# Patient Record
Sex: FEMALE | Race: WHITE | NOT HISPANIC OR LATINO | Employment: FULL TIME | URBAN - METROPOLITAN AREA
[De-identification: names, ages, dates, MRNs, and addresses within clinical notes are randomized per-mention and may not be internally consistent; named-entity substitution may affect disease eponyms.]

---

## 2019-09-16 ENCOUNTER — APPOINTMENT (OUTPATIENT)
Dept: LAB | Facility: HOSPITAL | Age: 64
End: 2019-09-16

## 2019-09-16 ENCOUNTER — TRANSCRIBE ORDERS (OUTPATIENT)
Dept: OCCUPATIONAL MEDICINE | Facility: CLINIC | Age: 64
End: 2019-09-16

## 2019-09-16 ENCOUNTER — APPOINTMENT (OUTPATIENT)
Dept: OCCUPATIONAL MEDICINE | Facility: CLINIC | Age: 64
End: 2019-09-16

## 2019-09-16 DIAGNOSIS — Z00.8 SPECIAL EXAM: Primary | ICD-10-CM

## 2019-09-16 DIAGNOSIS — Z00.8 SPECIAL EXAM: ICD-10-CM

## 2019-09-16 LAB
HBV SURFACE AB SER-ACNC: <3.1 MIU/ML
RUBV IGG SERPL IA-ACNC: >175 IU/ML

## 2019-09-16 PROCEDURE — 86765 RUBEOLA ANTIBODY: CPT

## 2019-09-16 PROCEDURE — 86480 TB TEST CELL IMMUN MEASURE: CPT

## 2019-09-16 PROCEDURE — 36415 COLL VENOUS BLD VENIPUNCTURE: CPT

## 2019-09-16 PROCEDURE — 86762 RUBELLA ANTIBODY: CPT

## 2019-09-16 PROCEDURE — 86787 VARICELLA-ZOSTER ANTIBODY: CPT

## 2019-09-16 PROCEDURE — 86706 HEP B SURFACE ANTIBODY: CPT

## 2019-09-16 PROCEDURE — 86735 MUMPS ANTIBODY: CPT

## 2019-09-17 LAB
MEV IGG SER QL: NORMAL
MUV IGG SER QL: NORMAL
VZV IGG SER IA-ACNC: NORMAL

## 2019-09-18 LAB
GAMMA INTERFERON BACKGROUND BLD IA-ACNC: 0.03 IU/ML
M TB IFN-G BLD-IMP: NEGATIVE
M TB IFN-G CD4+ BCKGRND COR BLD-ACNC: 0 IU/ML
M TB IFN-G CD4+ BCKGRND COR BLD-ACNC: 0.01 IU/ML
MEV IGM SER-ACNC: <0.91 ISR (ref 0–0.9)
MITOGEN IGNF BCKGRD COR BLD-ACNC: >10 IU/ML

## 2019-11-11 ENCOUNTER — TRANSCRIBE ORDERS (OUTPATIENT)
Dept: ADMINISTRATIVE | Facility: HOSPITAL | Age: 64
End: 2019-11-11

## 2019-11-11 ENCOUNTER — APPOINTMENT (OUTPATIENT)
Dept: LAB | Facility: HOSPITAL | Age: 64
End: 2019-11-11

## 2019-11-11 DIAGNOSIS — Z01.84 IMMUNITY STATUS TESTING: Primary | ICD-10-CM

## 2019-11-11 DIAGNOSIS — Z01.84 IMMUNITY STATUS TESTING: ICD-10-CM

## 2019-11-11 LAB — HBV SURFACE AB SER-ACNC: >1000 MIU/ML

## 2019-11-11 PROCEDURE — 86706 HEP B SURFACE ANTIBODY: CPT

## 2019-11-11 PROCEDURE — 36415 COLL VENOUS BLD VENIPUNCTURE: CPT

## 2019-11-25 ENCOUNTER — TELEPHONE (OUTPATIENT)
Dept: PSYCHIATRY | Facility: CLINIC | Age: 64
End: 2019-11-25

## 2020-03-12 ENCOUNTER — APPOINTMENT (OUTPATIENT)
Dept: URGENT CARE | Facility: CLINIC | Age: 65
End: 2020-03-12
Payer: OTHER MISCELLANEOUS

## 2020-03-12 ENCOUNTER — APPOINTMENT (OUTPATIENT)
Dept: RADIOLOGY | Facility: CLINIC | Age: 65
End: 2020-03-12
Payer: OTHER MISCELLANEOUS

## 2020-03-12 DIAGNOSIS — M54.2 CERVICAL SPINE PAIN: Primary | ICD-10-CM

## 2020-03-12 DIAGNOSIS — M54.2 CERVICAL SPINE PAIN: ICD-10-CM

## 2020-03-12 PROCEDURE — G0382 LEV 3 HOSP TYPE B ED VISIT: HCPCS

## 2020-03-12 PROCEDURE — 72040 X-RAY EXAM NECK SPINE 2-3 VW: CPT

## 2020-03-12 PROCEDURE — 99283 EMERGENCY DEPT VISIT LOW MDM: CPT

## 2020-03-19 ENCOUNTER — APPOINTMENT (OUTPATIENT)
Dept: URGENT CARE | Facility: CLINIC | Age: 65
End: 2020-03-19
Payer: OTHER MISCELLANEOUS

## 2020-03-19 PROCEDURE — 99213 OFFICE O/P EST LOW 20 MIN: CPT

## 2020-04-02 ENCOUNTER — APPOINTMENT (OUTPATIENT)
Dept: URGENT CARE | Facility: CLINIC | Age: 65
End: 2020-04-02
Payer: OTHER MISCELLANEOUS

## 2020-04-02 PROCEDURE — 99213 OFFICE O/P EST LOW 20 MIN: CPT

## 2020-04-17 ENCOUNTER — APPOINTMENT (OUTPATIENT)
Dept: URGENT CARE | Facility: CLINIC | Age: 65
End: 2020-04-17
Payer: OTHER MISCELLANEOUS

## 2020-04-17 PROCEDURE — 99213 OFFICE O/P EST LOW 20 MIN: CPT

## 2020-12-23 ENCOUNTER — IMMUNIZATIONS (OUTPATIENT)
Dept: FAMILY MEDICINE CLINIC | Facility: HOSPITAL | Age: 65
End: 2020-12-23

## 2020-12-23 DIAGNOSIS — Z23 ENCOUNTER FOR IMMUNIZATION: ICD-10-CM

## 2020-12-23 PROCEDURE — 0011A SARS-COV-2 / COVID-19 MRNA VACCINE (MODERNA) 100 MCG: CPT

## 2020-12-23 PROCEDURE — 91301 SARS-COV-2 / COVID-19 MRNA VACCINE (MODERNA) 100 MCG: CPT

## 2021-01-20 ENCOUNTER — IMMUNIZATIONS (OUTPATIENT)
Dept: FAMILY MEDICINE CLINIC | Facility: HOSPITAL | Age: 66
End: 2021-01-20

## 2021-01-20 DIAGNOSIS — Z23 ENCOUNTER FOR IMMUNIZATION: Primary | ICD-10-CM

## 2021-01-20 PROCEDURE — 0012A SARS-COV-2 / COVID-19 MRNA VACCINE (MODERNA) 100 MCG: CPT

## 2021-01-20 PROCEDURE — 91301 SARS-COV-2 / COVID-19 MRNA VACCINE (MODERNA) 100 MCG: CPT

## 2021-06-03 ENCOUNTER — OFFICE VISIT (OUTPATIENT)
Dept: FAMILY MEDICINE CLINIC | Facility: CLINIC | Age: 66
End: 2021-06-03
Payer: COMMERCIAL

## 2021-06-03 VITALS
HEIGHT: 64 IN | SYSTOLIC BLOOD PRESSURE: 122 MMHG | OXYGEN SATURATION: 98 % | BODY MASS INDEX: 26.46 KG/M2 | HEART RATE: 76 BPM | TEMPERATURE: 97.2 F | WEIGHT: 155 LBS | DIASTOLIC BLOOD PRESSURE: 78 MMHG

## 2021-06-03 DIAGNOSIS — Z12.11 SCREENING FOR COLON CANCER: ICD-10-CM

## 2021-06-03 DIAGNOSIS — R63.5 WEIGHT GAIN: ICD-10-CM

## 2021-06-03 DIAGNOSIS — Z12.31 ENCOUNTER FOR SCREENING MAMMOGRAM FOR MALIGNANT NEOPLASM OF BREAST: ICD-10-CM

## 2021-06-03 DIAGNOSIS — Z00.00 ANNUAL PHYSICAL EXAM: Primary | ICD-10-CM

## 2021-06-03 DIAGNOSIS — H61.23 IMPACTED CERUMEN, BILATERAL: ICD-10-CM

## 2021-06-03 PROCEDURE — 69209 REMOVE IMPACTED EAR WAX UNI: CPT | Performed by: FAMILY MEDICINE

## 2021-06-03 PROCEDURE — 99386 PREV VISIT NEW AGE 40-64: CPT | Performed by: FAMILY MEDICINE

## 2021-06-03 NOTE — PROGRESS NOTES
[address] [place] [address]    NAME: Austen Nassar  AGE: 63 y o  SEX: female  : 1955     DATE: 6/3/2021 Ear cerumen removal    Date/Time: 6/3/2021 2:57 PM  Performed by: Kim Mercado MD  Authorized by: Kim Mercado MD   Universal Protocol:  Consent: Verbal consent obtained  Consent given by: patient  Patient identity confirmed: verbally with patient      Patient location:  Clinic  Procedure details:     Local anesthetic:  None    Location:  L ear and R ear    Procedure type: irrigation only      Approach:  External  Post-procedure details:     Complication:  None    Hearing quality:  Diminished    Patient tolerance of procedure: Tolerated well, no immediate complications  Comments:      Unsuccessful, needs to go home and apply debrox drops over the next few days, will try irrigation at home in addition, will f/u if no wax comes out, wax last cleaned out over 55 years ago when  She was a child           Assessment and Plan:     Problem List Items Addressed This Visit     None      Visit Diagnoses     Annual physical exam    -  Primary    Relevant Orders    CBC and differential    Comprehensive metabolic panel    Lipid panel    Screening for colon cancer        Relevant Orders    Cologuard    Encounter for screening mammogram for malignant neoplasm of breast        Relevant Orders    Mammo screening bilateral w 3d & cad    Weight gain        Relevant Orders    TSH, 3rd generation          Immunizations and preventive care screenings were discussed with patient today  Appropriate education was printed on patient's after visit summary  Counseling:  Dental Health: discussed importance of regular tooth brushing, flossing, and dental visits  · Exercise: the importance of regular exercise/physical activity was discussed  Recommend exercise 3-5 times per week for at least 30 minutes  BMI Counseling: Body mass index is 26 61 kg/m²   The BMI is above normal  Nutrition recommendations include decreasing portion sizes, encouraging healthy choices of fruits and vegetables, consuming healthier snacks, limiting drinks that contain sugar, moderation in carbohydrate intake and increasing intake of lean protein  Exercise recommendations include moderate physical activity 150 minutes/week, exercising 3-5 times per week and strength training exercises  No pharmacotherapy was ordered  Patient referred to PCP due to patient being overweight  Return in about 1 year (around 6/3/2022)  Chief Complaint:     Chief Complaint   Patient presents with    Establish Care      History of Present Illness:     Adult Annual Physical   Patient here for a comprehensive physical exam  The patient reports no problems  Diet and Physical Activity  · Diet/Nutrition: well balanced diet, low carb diet, consuming 3-5 servings of fruits/vegetables daily and adequate fiber intake  · Exercise: walking, moderate cardiovascular exercise, strength training exercises, 3-4 times a week on average and 30-60 minutes on average  Depression Screening  PHQ-9 Depression Screening    PHQ-9:   Frequency of the following problems over the past two weeks:      Little interest or pleasure in doing things: 0 - not at all  Feeling down, depressed, or hopeless: 0 - not at all  PHQ-2 Score: 0       General Health  · Sleep: sleeps well and gets 7-8 hours of sleep on average  · Hearing: normal - bilateral   · Vision: goes for regular eye exams, most recent eye exam <1 year ago and wears contacts  · Dental: regular dental visits and brushes teeth twice daily  /GYN Health  · Patient is: postmenopausal  · Last menstrual period: 2016  · Contraceptive method: post jamie  Review of Systems:     Review of Systems   Constitutional: Negative for fatigue and fever  HENT: Negative for congestion  Eyes: Negative for visual disturbance     Respiratory: Negative for chest tightness and shortness of breath  Cardiovascular: Negative for chest pain and palpitations  Gastrointestinal: Negative for abdominal pain, constipation and diarrhea  Genitourinary: Negative for difficulty urinating  Musculoskeletal: Positive for arthralgias (s/p shoulder surgeries)  Neurological: Negative for headaches  Hematological: Does not bruise/bleed easily        Past Medical History:     Past Medical History:   Diagnosis Date    Allergic Not sure    Hives      Past Surgical History:     Past Surgical History:   Procedure Laterality Date    APPENDECTOMY  May 1986     SECTION  1985, 06/15/1987, 1990   [facility] SHOULDER SURGERY  ,       Social History:        Social History     Socioeconomic History    Marital status: Single     Spouse name: None    Number of children: None    Years of education: None    Highest education level: None   Occupational History    None   Social Needs    Financial resource strain: None    Food insecurity     Worry: None     Inability: None    Transportation needs     Medical: None     Non-medical: None   Tobacco Use    Smoking status: Never Smoker    Smokeless tobacco: Never Used   Substance and Sexual Activity    Alcohol use: Not Currently     Alcohol/week: 2 0 standard drinks     Types: 2 Glasses of wine per week    Drug use: Never    Sexual activity: Yes     Partners: Male     Birth control/protection: None     Comment: Monogamy   Lifestyle    Physical activity     Days per week: None     Minutes per session: None    Stress: None   Relationships    Social connections     Talks on phone: None     Gets together: None     Attends Voodoo service: None     Active member of club or organization: None     Attends meetings of clubs or organizations: None     Relationship status: None    Intimate partner violence     Fear of current or ex partner: None     Emotionally abused: None     Physically abused: None     Forced sexual activity: None Other Topics Concern    None   Social History Narrative    None      Family History:     Family History   Problem Relation Age of Onset    Cancer Sister         Leukemia    Leukemia Sister     Heart attack Mother 80    Abdominal aortic aneurysm Father     Arthritis Sister       Current Medications:     No current outpatient medications on file  No current facility-administered medications for this visit  Allergies: Allergies   Allergen Reactions    Penicillins Hives and Itching      Physical Exam:     /78 (BP Location: Right arm, Patient Position: Sitting, Cuff Size: Standard)   Pulse 76   Temp (!) 97 2 °F (36 2 °C)   Ht 5' 4" (1 626 m)   Wt 70 3 kg (155 lb)   SpO2 98%   BMI 26 61 kg/m²     Physical Exam  Vitals signs reviewed  Constitutional:       Appearance: Normal appearance  She is well-developed  HENT:      Head: Normocephalic and atraumatic  Right Ear: Tympanic membrane, ear canal and external ear normal  There is impacted cerumen  Left Ear: Tympanic membrane, ear canal and external ear normal  There is impacted cerumen  Eyes:      Conjunctiva/sclera: Conjunctivae normal       Pupils: Pupils are equal, round, and reactive to light  Neck:      Musculoskeletal: Normal range of motion and neck supple  Cardiovascular:      Rate and Rhythm: Normal rate and regular rhythm  Heart sounds: Normal heart sounds  Pulmonary:      Effort: Pulmonary effort is normal       Breath sounds: Normal breath sounds  Abdominal:      General: Bowel sounds are normal       Palpations: Abdomen is soft  Musculoskeletal: Normal range of motion  Lymphadenopathy:      Cervical: No cervical adenopathy  Skin:     General: Skin is warm  Capillary Refill: Capillary refill takes less than 2 seconds  Neurological:      Mental Status: She is alert and oriented to person, place, and time     Psychiatric:         Mood and Affect: Mood normal          Behavior: Behavior normal          Thought Content:  Thought content normal          Judgment: Judgment normal           Katherine Litten, MD [facility]

## 2021-06-03 NOTE — PATIENT INSTRUCTIONS

## 2021-06-15 ENCOUNTER — APPOINTMENT (OUTPATIENT)
Dept: LAB | Facility: CLINIC | Age: 66
End: 2021-06-15
Payer: COMMERCIAL

## 2021-06-15 DIAGNOSIS — Z00.00 ANNUAL PHYSICAL EXAM: ICD-10-CM

## 2021-06-15 DIAGNOSIS — R63.5 WEIGHT GAIN: ICD-10-CM

## 2021-06-15 LAB
ALBUMIN SERPL BCP-MCNC: 4.2 G/DL (ref 3.5–5)
ALP SERPL-CCNC: 61 U/L (ref 46–116)
ALT SERPL W P-5'-P-CCNC: 20 U/L (ref 12–78)
ANION GAP SERPL CALCULATED.3IONS-SCNC: 4 MMOL/L (ref 4–13)
AST SERPL W P-5'-P-CCNC: 19 U/L (ref 5–45)
BASOPHILS # BLD AUTO: 0.04 THOUSANDS/ΜL (ref 0–0.1)
BASOPHILS NFR BLD AUTO: 1 % (ref 0–1)
BILIRUB SERPL-MCNC: 0.81 MG/DL (ref 0.2–1)
BUN SERPL-MCNC: 12 MG/DL (ref 5–25)
CALCIUM SERPL-MCNC: 8.6 MG/DL (ref 8.3–10.1)
CHLORIDE SERPL-SCNC: 109 MMOL/L (ref 100–108)
CHOLEST SERPL-MCNC: 209 MG/DL (ref 50–200)
CO2 SERPL-SCNC: 30 MMOL/L (ref 21–32)
CREAT SERPL-MCNC: 0.84 MG/DL (ref 0.6–1.3)
EOSINOPHIL # BLD AUTO: 0.14 THOUSAND/ΜL (ref 0–0.61)
EOSINOPHIL NFR BLD AUTO: 3 % (ref 0–6)
ERYTHROCYTE [DISTWIDTH] IN BLOOD BY AUTOMATED COUNT: 12.9 % (ref 11.6–15.1)
GFR SERPL CREATININE-BSD FRML MDRD: 74 ML/MIN/1.73SQ M
GLUCOSE P FAST SERPL-MCNC: 84 MG/DL (ref 65–99)
HCT VFR BLD AUTO: 40.2 % (ref 34.8–46.1)
HDLC SERPL-MCNC: 66 MG/DL
HGB BLD-MCNC: 13.3 G/DL (ref 11.5–15.4)
IMM GRANULOCYTES # BLD AUTO: 0.02 THOUSAND/UL (ref 0–0.2)
IMM GRANULOCYTES NFR BLD AUTO: 0 % (ref 0–2)
LDLC SERPL CALC-MCNC: 125 MG/DL (ref 0–100)
LYMPHOCYTES # BLD AUTO: 1.21 THOUSANDS/ΜL (ref 0.6–4.47)
LYMPHOCYTES NFR BLD AUTO: 27 % (ref 14–44)
MCH RBC QN AUTO: 29.9 PG (ref 26.8–34.3)
MCHC RBC AUTO-ENTMCNC: 33.1 G/DL (ref 31.4–37.4)
MCV RBC AUTO: 90 FL (ref 82–98)
MONOCYTES # BLD AUTO: 0.39 THOUSAND/ΜL (ref 0.17–1.22)
MONOCYTES NFR BLD AUTO: 9 % (ref 4–12)
NEUTROPHILS # BLD AUTO: 2.7 THOUSANDS/ΜL (ref 1.85–7.62)
NEUTS SEG NFR BLD AUTO: 60 % (ref 43–75)
NONHDLC SERPL-MCNC: 143 MG/DL
NRBC BLD AUTO-RTO: 0 /100 WBCS
PLATELET # BLD AUTO: 247 THOUSANDS/UL (ref 149–390)
PMV BLD AUTO: 10.2 FL (ref 8.9–12.7)
POTASSIUM SERPL-SCNC: 3.9 MMOL/L (ref 3.5–5.3)
PROT SERPL-MCNC: 7.3 G/DL (ref 6.4–8.2)
RBC # BLD AUTO: 4.45 MILLION/UL (ref 3.81–5.12)
SODIUM SERPL-SCNC: 143 MMOL/L (ref 136–145)
TRIGL SERPL-MCNC: 91 MG/DL
TSH SERPL DL<=0.05 MIU/L-ACNC: 7.73 UIU/ML (ref 0.36–3.74)
WBC # BLD AUTO: 4.5 THOUSAND/UL (ref 4.31–10.16)

## 2021-06-15 PROCEDURE — 80053 COMPREHEN METABOLIC PANEL: CPT

## 2021-06-15 PROCEDURE — 80061 LIPID PANEL: CPT

## 2021-06-15 PROCEDURE — 85025 COMPLETE CBC W/AUTO DIFF WBC: CPT

## 2021-06-15 PROCEDURE — 36415 COLL VENOUS BLD VENIPUNCTURE: CPT

## 2021-06-15 PROCEDURE — 84443 ASSAY THYROID STIM HORMONE: CPT

## 2021-06-16 DIAGNOSIS — R94.6 ABNORMAL THYROID FUNCTION TEST: Primary | ICD-10-CM

## 2021-06-17 ENCOUNTER — TELEPHONE (OUTPATIENT)
Dept: FAMILY MEDICINE CLINIC | Facility: CLINIC | Age: 66
End: 2021-06-17

## 2021-06-17 ENCOUNTER — APPOINTMENT (OUTPATIENT)
Dept: LAB | Facility: CLINIC | Age: 66
End: 2021-06-17
Payer: COMMERCIAL

## 2021-06-17 LAB
T3 SERPL-MCNC: 0.9 NG/ML (ref 0.6–1.8)
T4 SERPL-MCNC: 7.3 UG/DL (ref 4.7–13.3)
TSH SERPL DL<=0.05 MIU/L-ACNC: 7.66 UIU/ML (ref 0.36–3.74)

## 2021-06-17 PROCEDURE — 36415 COLL VENOUS BLD VENIPUNCTURE: CPT | Performed by: PHYSICIAN ASSISTANT

## 2021-06-17 PROCEDURE — 84436 ASSAY OF TOTAL THYROXINE: CPT | Performed by: PHYSICIAN ASSISTANT

## 2021-06-17 PROCEDURE — 84480 ASSAY TRIIODOTHYRONINE (T3): CPT | Performed by: PHYSICIAN ASSISTANT

## 2021-06-17 PROCEDURE — 84443 ASSAY THYROID STIM HORMONE: CPT | Performed by: PHYSICIAN ASSISTANT

## 2021-06-17 NOTE — TELEPHONE ENCOUNTER
----- Message from Jaime Rawls sent at 6/16/2021  4:24 PM EDT -----  Regarding: RE:elevated  TSH  Contact: [phone]  Where do I go for a thyroid ultrasound?

## 2021-06-18 ENCOUNTER — HOSPITAL ENCOUNTER (OUTPATIENT)
Dept: ULTRASOUND IMAGING | Facility: CLINIC | Age: 66
Discharge: HOME | End: 2021-06-18
Payer: COMMERCIAL

## 2021-06-18 DIAGNOSIS — R94.6 ABNORMAL THYROID FUNCTION TEST: ICD-10-CM

## 2021-06-18 PROCEDURE — 76536 US EXAM OF HEAD AND NECK: CPT

## 2021-06-21 ENCOUNTER — TELEPHONE (OUTPATIENT)
Dept: FAMILY MEDICINE CLINIC | Facility: CLINIC | Age: 66
End: 2021-06-21

## 2021-06-21 DIAGNOSIS — Z00.00 ROUTINE MEDICAL EXAM: ICD-10-CM

## 2021-06-21 DIAGNOSIS — E03.9 ACQUIRED HYPOTHYROIDISM: Primary | ICD-10-CM

## 2021-06-21 RX ORDER — LEVOTHYROXINE SODIUM 0.03 MG/1
25 TABLET ORAL DAILY
Qty: 90 TABLET | Refills: 1 | Status: SHIPPED | OUTPATIENT
Start: 2021-06-21 | End: 2021-07-26

## 2021-06-21 NOTE — TELEPHONE ENCOUNTER
Spoke  With  Pt    Thyroid  U/s  Is  Pending  tsh  Is  Elevated  Pt  Is  Symptomatic  Start  Levothyroxine  At  25  Mcgm     Repeat tsh  Is   6  weeks

## 2021-06-21 NOTE — TELEPHONE ENCOUNTER
Regarding: Test Results Question  Contact: [phone]  ----- Message from Evelyn Wilson sent at 6/21/2021  1:53 PM EDT -----       ----- Message from Carlito Lincoln to Lloyd Sawyer MD sent at 6/21/2021 12:41 PM -----   So I had the  repeat bloodwork and the ultrasound completed  I do not see any results from the ultrasound even though I had it on Friday  Can you please tell me what the next step is  Since I am symptomatic, with weight gain, hair loss, always being tired and cold

## 2021-07-26 ENCOUNTER — APPOINTMENT (OUTPATIENT)
Dept: LAB | Facility: CLINIC | Age: 66
End: 2021-07-26
Payer: COMMERCIAL

## 2021-07-26 ENCOUNTER — TELEPHONE (OUTPATIENT)
Dept: FAMILY MEDICINE CLINIC | Facility: CLINIC | Age: 66
End: 2021-07-26

## 2021-07-26 DIAGNOSIS — Z00.8 ENCOUNTER FOR OTHER GENERAL EXAMINATION: ICD-10-CM

## 2021-07-26 DIAGNOSIS — Z00.8 HEALTH EXAMINATION IN POPULATION SURVEYS: ICD-10-CM

## 2021-07-26 DIAGNOSIS — E03.9 ACQUIRED HYPOTHYROIDISM: Primary | ICD-10-CM

## 2021-07-26 LAB
CHOLEST SERPL-MCNC: 203 MG/DL (ref 50–200)
EST. AVERAGE GLUCOSE BLD GHB EST-MCNC: 105 MG/DL
HBA1C MFR BLD: 5.3 %
HDLC SERPL-MCNC: 67 MG/DL
LDLC SERPL CALC-MCNC: 123 MG/DL (ref 0–100)
NONHDLC SERPL-MCNC: 136 MG/DL
TRIGL SERPL-MCNC: 67 MG/DL
TSH SERPL DL<=0.05 MIU/L-ACNC: 6.02 UIU/ML (ref 0.36–3.74)

## 2021-07-26 PROCEDURE — 83036 HEMOGLOBIN GLYCOSYLATED A1C: CPT

## 2021-07-26 PROCEDURE — 84443 ASSAY THYROID STIM HORMONE: CPT | Performed by: PHYSICIAN ASSISTANT

## 2021-07-26 PROCEDURE — 36415 COLL VENOUS BLD VENIPUNCTURE: CPT | Performed by: PHYSICIAN ASSISTANT

## 2021-07-26 PROCEDURE — 80061 LIPID PANEL: CPT

## 2021-07-26 RX ORDER — LEVOTHYROXINE SODIUM 0.05 MG/1
50 TABLET ORAL DAILY
Qty: 90 TABLET | Refills: 0 | Status: SHIPPED | OUTPATIENT
Start: 2021-07-26 | End: 2021-10-10

## 2021-07-26 NOTE — TELEPHONE ENCOUNTER
Left   message  For   Pt   tsh  Is s till  Elevated  increase  Levothyroxine  To  5o  Mcgm    Repeat  tsh  In  10  wweks

## 2021-08-02 ENCOUNTER — TELEPHONE (OUTPATIENT)
Dept: FAMILY MEDICINE CLINIC | Facility: CLINIC | Age: 66
End: 2021-08-02

## 2021-08-24 ENCOUNTER — OFFICE VISIT (OUTPATIENT)
Dept: OBGYN CLINIC | Facility: CLINIC | Age: 66
End: 2021-08-24
Payer: COMMERCIAL

## 2021-08-24 VITALS — SYSTOLIC BLOOD PRESSURE: 130 MMHG | DIASTOLIC BLOOD PRESSURE: 80 MMHG

## 2021-08-24 DIAGNOSIS — Z01.419 ENCOUNTER FOR GYNECOLOGICAL EXAMINATION (GENERAL) (ROUTINE) WITHOUT ABNORMAL FINDINGS: Primary | ICD-10-CM

## 2021-08-24 PROCEDURE — G0476 HPV COMBO ASSAY CA SCREEN: HCPCS | Performed by: STUDENT IN AN ORGANIZED HEALTH CARE EDUCATION/TRAINING PROGRAM

## 2021-08-24 PROCEDURE — G0145 SCR C/V CYTO,THINLAYER,RESCR: HCPCS | Performed by: STUDENT IN AN ORGANIZED HEALTH CARE EDUCATION/TRAINING PROGRAM

## 2021-08-24 PROCEDURE — 99386 PREV VISIT NEW AGE 40-64: CPT | Performed by: STUDENT IN AN ORGANIZED HEALTH CARE EDUCATION/TRAINING PROGRAM

## 2021-08-24 NOTE — PROGRESS NOTES
Annette Addie   1955    CC:  Yearly exam    A:  Yearly exam      Problem List Items Addressed This Visit     None      Visit Diagnoses     Encounter for gynecological examination (general) (routine) without abnormal findings    -  Primary          P:   Pap collected today  We reviewed ASCCP guidelines for Pap testing  Mammo previously ordered   Cologard per PCP    RTO one year for yearly exam or sooner as needed  S:  63 y o  female here for yearly exam  She is postmenopausal and has had no vaginal bleeding  She denies vaginal discharge, itching, odor or dryness  Sexual activity: She is sexually active without pain, bleeding or dryness  STD testing: She does not want STD testing today       Menopausal  Last Pap: unsure, denies history abnormal  Last Mammo: scheduled  Last Colonoscopy: desires cologard    No smoking, drugs, alcohol     Family hx of breast/ovarian/colon cancer: denies      Current Outpatient Medications:     levothyroxine 50 mcg tablet, Take 1 tablet (50 mcg total) by mouth daily, Disp: 90 tablet, Rfl: 0  Social History     Socioeconomic History    Marital status: Single     Spouse name: Not on file    Number of children: Not on file    Years of education: Not on file    Highest education level: Not on file   Occupational History    Not on file   Tobacco Use    Smoking status: Never Smoker    Smokeless tobacco: Never Used   Substance and Sexual Activity    Alcohol use: Not Currently     Alcohol/week: 2 0 standard drinks     Types: 2 Glasses of wine per week    Drug use: Never    Sexual activity: Yes     Partners: Male     Birth control/protection: None     Comment: Monogamy   Other Topics Concern    Not on file   Social History Narrative    Not on file     Social Determinants of Health     Financial Resource Strain:     Difficulty of Paying Living Expenses:    Food Insecurity:     Worried About Running Out of Food in the Last Year:     [address] in the Last Year:    Transportation Needs:     Lack of Transportation (Medical):  Lack of Transportation (Non-Medical):    Physical Activity:     Days of Exercise per Week:     Minutes of Exercise per Session:    Stress:     Feeling of Stress :    Social Connections:     Frequency of Communication with Friends and Family:     Frequency of Social Gatherings with Friends and Family:     Attends Latter day Services:     Active Member of Clubs or Organizations:     Attends Club or Organization Meetings:     Marital Status:    Intimate Partner Violence:     Fear of Current or Ex-Partner:     Emotionally Abused:     Physically Abused:     Sexually Abused:      Family History   Problem Relation Age of Onset    Cancer Sister         Leukemia    Leukemia Sister     Heart attack Mother 80    Abdominal aortic aneurysm Father     Arthritis Sister      Past Medical History:   Diagnosis Date    Allergic Not sure    Hives        Review of Systems   Respiratory: Negative  Cardiovascular: Negative  Gastrointestinal: Negative for constipation and diarrhea  Genitourinary: Negative for difficulty urinating, pelvic pain, vaginal bleeding, vaginal discharge, itching or odor  O:  Blood pressure 130/80  Patient appears well and is not in distress  Neck is supple without masses  Breasts are symmetrical without mass, tenderness, nipple discharge, skin changes or adenopathy  Abdomen is soft and nontender without masses  Vulva without lesions or rashes  Urethral meatus and urethra are normal  Bladder is normal to palpation  Vagina is normal without discharge or bleeding  Cervix is stenotic, but normal without discharge or lesion  Uterus and adnexa are normal, mobile, nontender without palpable mass    Rectum normal in appearance

## 2021-08-26 LAB
HPV HR 12 DNA CVX QL NAA+PROBE: POSITIVE
HPV16 DNA CVX QL NAA+PROBE: NEGATIVE
HPV18 DNA CVX QL NAA+PROBE: NEGATIVE

## 2021-08-31 LAB
LAB AP GYN PRIMARY INTERPRETATION: NORMAL
Lab: NORMAL

## 2021-09-01 ENCOUNTER — TELEPHONE (OUTPATIENT)
Dept: OBGYN CLINIC | Facility: CLINIC | Age: 66
End: 2021-09-01

## 2021-09-01 NOTE — TELEPHONE ENCOUNTER
Spoke to pt and she is aware of results      ----- Message from Travon Machado MD sent at 8/31/2021  5:30 PM EDT -----  Please call Adore Martins regarding her abnormal result   Because her cytology is normal, I am reassured that all is ok, but because she has high risk HPV, I recommend repeating her pap in a year

## 2021-09-02 ENCOUNTER — HOSPITAL ENCOUNTER (OUTPATIENT)
Dept: MAMMOGRAPHY | Facility: CLINIC | Age: 66
Discharge: HOME | End: 2021-09-02
Payer: COMMERCIAL

## 2021-09-02 DIAGNOSIS — Z12.31 ENCOUNTER FOR SCREENING MAMMOGRAM FOR MALIGNANT NEOPLASM OF BREAST: ICD-10-CM

## 2021-09-02 PROCEDURE — 77067 SCR MAMMO BI INCL CAD: CPT

## 2021-09-02 PROCEDURE — 77063 BREAST TOMOSYNTHESIS BI: CPT

## 2021-10-07 ENCOUNTER — APPOINTMENT (OUTPATIENT)
Dept: LAB | Facility: CLINIC | Age: 66
End: 2021-10-07
Payer: COMMERCIAL

## 2021-10-07 DIAGNOSIS — E03.9 ACQUIRED HYPOTHYROIDISM: Primary | ICD-10-CM

## 2021-10-07 DIAGNOSIS — E03.9 ACQUIRED HYPOTHYROIDISM: ICD-10-CM

## 2021-10-07 LAB — TSH SERPL DL<=0.05 MIU/L-ACNC: 2.56 UIU/ML (ref 0.36–3.74)

## 2021-10-07 PROCEDURE — 36415 COLL VENOUS BLD VENIPUNCTURE: CPT

## 2021-10-07 PROCEDURE — 84443 ASSAY THYROID STIM HORMONE: CPT

## 2021-10-10 DIAGNOSIS — E03.9 ACQUIRED HYPOTHYROIDISM: ICD-10-CM

## 2021-10-10 RX ORDER — LEVOTHYROXINE SODIUM 0.05 MG/1
TABLET ORAL
Qty: 30 TABLET | Refills: 2 | Status: SHIPPED | OUTPATIENT
Start: 2021-10-10 | End: 2021-10-11 | Stop reason: SDUPTHER

## 2021-10-11 DIAGNOSIS — E03.9 ACQUIRED HYPOTHYROIDISM: ICD-10-CM

## 2021-10-11 RX ORDER — LEVOTHYROXINE SODIUM 0.05 MG/1
50 TABLET ORAL DAILY
Qty: 90 TABLET | Refills: 1 | Status: SHIPPED | OUTPATIENT
Start: 2021-10-11 | End: 2021-12-29 | Stop reason: SDUPTHER

## 2021-10-27 ENCOUNTER — IMMUNIZATIONS (OUTPATIENT)
Dept: FAMILY MEDICINE CLINIC | Facility: HOSPITAL | Age: 66
End: 2021-10-27

## 2021-10-27 DIAGNOSIS — Z23 ENCOUNTER FOR IMMUNIZATION: Primary | ICD-10-CM

## 2021-12-29 DIAGNOSIS — E03.9 ACQUIRED HYPOTHYROIDISM: ICD-10-CM

## 2021-12-30 RX ORDER — LEVOTHYROXINE SODIUM 0.05 MG/1
50 TABLET ORAL DAILY
Qty: 90 TABLET | Refills: 0 | Status: SHIPPED | OUTPATIENT
Start: 2021-12-30 | End: 2022-03-17

## 2022-02-23 ENCOUNTER — TELEPHONE (OUTPATIENT)
Dept: FAMILY MEDICINE CLINIC | Facility: CLINIC | Age: 67
End: 2022-02-23

## 2022-03-17 DIAGNOSIS — E03.9 ACQUIRED HYPOTHYROIDISM: ICD-10-CM

## 2022-03-17 RX ORDER — LEVOTHYROXINE SODIUM 0.05 MG/1
TABLET ORAL
Qty: 90 TABLET | Refills: 0 | Status: SHIPPED | OUTPATIENT
Start: 2022-03-17 | End: 2022-06-09 | Stop reason: SDUPTHER

## 2022-04-04 ENCOUNTER — APPOINTMENT (OUTPATIENT)
Dept: LAB | Facility: CLINIC | Age: 67
End: 2022-04-04
Payer: COMMERCIAL

## 2022-04-04 DIAGNOSIS — Z00.8 HEALTH EXAMINATION IN POPULATION SURVEY: ICD-10-CM

## 2022-04-04 LAB
CHOLEST SERPL-MCNC: 218 MG/DL
EST. AVERAGE GLUCOSE BLD GHB EST-MCNC: 100 MG/DL
HBA1C MFR BLD: 5.1 %
HDLC SERPL-MCNC: 63 MG/DL
LDLC SERPL CALC-MCNC: 142 MG/DL (ref 0–100)
NONHDLC SERPL-MCNC: 155 MG/DL
TRIGL SERPL-MCNC: 64 MG/DL

## 2022-04-04 PROCEDURE — 83036 HEMOGLOBIN GLYCOSYLATED A1C: CPT

## 2022-04-04 PROCEDURE — 80061 LIPID PANEL: CPT

## 2022-04-04 PROCEDURE — 36415 COLL VENOUS BLD VENIPUNCTURE: CPT

## 2022-05-19 DIAGNOSIS — E03.9 ACQUIRED HYPOTHYROIDISM: Primary | ICD-10-CM

## 2022-05-27 ENCOUNTER — TELEPHONE (OUTPATIENT)
Dept: FAMILY MEDICINE CLINIC | Facility: CLINIC | Age: 67
End: 2022-05-27

## 2022-05-27 NOTE — TELEPHONE ENCOUNTER
Called and spoke with patient  She asked for no more calls about the cologuard kit- Stated is she chooses to do it, she will  "She's not 17years old"

## 2022-06-06 ENCOUNTER — APPOINTMENT (OUTPATIENT)
Dept: LAB | Facility: CLINIC | Age: 67
End: 2022-06-06
Payer: COMMERCIAL

## 2022-06-06 DIAGNOSIS — E03.9 ACQUIRED HYPOTHYROIDISM: ICD-10-CM

## 2022-06-06 LAB
ALBUMIN SERPL BCP-MCNC: 4 G/DL (ref 3.5–5)
ALP SERPL-CCNC: 63 U/L (ref 46–116)
ALT SERPL W P-5'-P-CCNC: 28 U/L (ref 12–78)
ANION GAP SERPL CALCULATED.3IONS-SCNC: 4 MMOL/L (ref 4–13)
AST SERPL W P-5'-P-CCNC: 24 U/L (ref 5–45)
BASOPHILS # BLD AUTO: 0.04 THOUSANDS/ΜL (ref 0–0.1)
BASOPHILS NFR BLD AUTO: 1 % (ref 0–1)
BILIRUB SERPL-MCNC: 0.52 MG/DL (ref 0.2–1)
BUN SERPL-MCNC: 18 MG/DL (ref 5–25)
CALCIUM SERPL-MCNC: 9.4 MG/DL (ref 8.3–10.1)
CHLORIDE SERPL-SCNC: 108 MMOL/L (ref 100–108)
CO2 SERPL-SCNC: 30 MMOL/L (ref 21–32)
CREAT SERPL-MCNC: 0.76 MG/DL (ref 0.6–1.3)
EOSINOPHIL # BLD AUTO: 0.14 THOUSAND/ΜL (ref 0–0.61)
EOSINOPHIL NFR BLD AUTO: 3 % (ref 0–6)
ERYTHROCYTE [DISTWIDTH] IN BLOOD BY AUTOMATED COUNT: 11.8 % (ref 11.6–15.1)
GFR SERPL CREATININE-BSD FRML MDRD: 83 ML/MIN/1.73SQ M
GLUCOSE P FAST SERPL-MCNC: 87 MG/DL (ref 65–99)
HCT VFR BLD AUTO: 38.8 % (ref 34.8–46.1)
HGB BLD-MCNC: 13.2 G/DL (ref 11.5–15.4)
IMM GRANULOCYTES # BLD AUTO: 0.01 THOUSAND/UL (ref 0–0.2)
IMM GRANULOCYTES NFR BLD AUTO: 0 % (ref 0–2)
LYMPHOCYTES # BLD AUTO: 1.24 THOUSANDS/ΜL (ref 0.6–4.47)
LYMPHOCYTES NFR BLD AUTO: 25 % (ref 14–44)
MCH RBC QN AUTO: 30.6 PG (ref 26.8–34.3)
MCHC RBC AUTO-ENTMCNC: 34 G/DL (ref 31.4–37.4)
MCV RBC AUTO: 90 FL (ref 82–98)
MONOCYTES # BLD AUTO: 0.44 THOUSAND/ΜL (ref 0.17–1.22)
MONOCYTES NFR BLD AUTO: 9 % (ref 4–12)
NEUTROPHILS # BLD AUTO: 3.19 THOUSANDS/ΜL (ref 1.85–7.62)
NEUTS SEG NFR BLD AUTO: 62 % (ref 43–75)
NRBC BLD AUTO-RTO: 0 /100 WBCS
PLATELET # BLD AUTO: 221 THOUSANDS/UL (ref 149–390)
PMV BLD AUTO: 10 FL (ref 8.9–12.7)
POTASSIUM SERPL-SCNC: 3.9 MMOL/L (ref 3.5–5.3)
PROT SERPL-MCNC: 7.2 G/DL (ref 6.4–8.2)
RBC # BLD AUTO: 4.32 MILLION/UL (ref 3.81–5.12)
SODIUM SERPL-SCNC: 142 MMOL/L (ref 136–145)
TSH SERPL DL<=0.05 MIU/L-ACNC: 2 UIU/ML (ref 0.45–4.5)
WBC # BLD AUTO: 5.06 THOUSAND/UL (ref 4.31–10.16)

## 2022-06-06 PROCEDURE — 85025 COMPLETE CBC W/AUTO DIFF WBC: CPT

## 2022-06-06 PROCEDURE — 80053 COMPREHEN METABOLIC PANEL: CPT

## 2022-06-06 PROCEDURE — 36415 COLL VENOUS BLD VENIPUNCTURE: CPT

## 2022-06-06 PROCEDURE — 84443 ASSAY THYROID STIM HORMONE: CPT

## 2022-06-09 ENCOUNTER — OFFICE VISIT (OUTPATIENT)
Dept: FAMILY MEDICINE CLINIC | Facility: CLINIC | Age: 67
End: 2022-06-09
Payer: COMMERCIAL

## 2022-06-09 VITALS
SYSTOLIC BLOOD PRESSURE: 128 MMHG | HEIGHT: 64 IN | BODY MASS INDEX: 25.81 KG/M2 | WEIGHT: 151.2 LBS | OXYGEN SATURATION: 98 % | HEART RATE: 102 BPM | DIASTOLIC BLOOD PRESSURE: 80 MMHG | TEMPERATURE: 97.3 F

## 2022-06-09 DIAGNOSIS — Z12.11 COLON CANCER SCREENING: ICD-10-CM

## 2022-06-09 DIAGNOSIS — E03.9 ACQUIRED HYPOTHYROIDISM: ICD-10-CM

## 2022-06-09 DIAGNOSIS — Z00.00 ANNUAL PHYSICAL EXAM: Primary | ICD-10-CM

## 2022-06-09 PROCEDURE — 99396 PREV VISIT EST AGE 40-64: CPT | Performed by: FAMILY MEDICINE

## 2022-06-09 RX ORDER — LEVOTHYROXINE SODIUM 0.05 MG/1
50 TABLET ORAL DAILY
Qty: 90 TABLET | Refills: 3 | Status: SHIPPED | OUTPATIENT
Start: 2022-06-09

## 2022-06-09 NOTE — PATIENT INSTRUCTIONS

## 2022-06-09 NOTE — PROGRESS NOTES
[address] [place] [address]    NAME: Alfonso Fernandez  AGE: 61 y o  SEX: female  : 1955     DATE: 2022     Assessment and Plan:     Problem List Items Addressed This Visit        Endocrine    Acquired hypothyroidism    Relevant Medications    levothyroxine 50 mcg tablet      Other Visit Diagnoses     Annual physical exam    -  Primary    Colon cancer screening        Relevant Orders    Cologuard          Immunizations and preventive care screenings were discussed with patient today  Appropriate education was printed on patient's after visit summary  Counseling:  Dental Health: discussed importance of regular tooth brushing, flossing, and dental visits  · Exercise: the importance of regular exercise/physical activity was discussed  Recommend exercise 3-5 times per week for at least 30 minutes  BMI Counseling: Body mass index is 25 95 kg/m²  The BMI is above normal  Nutrition recommendations include decreasing portion sizes, decreasing fast food intake, consuming healthier snacks, limiting drinks that contain sugar and moderation in carbohydrate intake  Exercise recommendations include moderate physical activity 150 minutes/week, exercising 3-5 times per week and strength training exercises  No pharmacotherapy was ordered  Patient referred to PCP  Rationale for BMI follow-up plan is due to patient being overweight or obese  walking running intervals    Depression Screening and Follow-up Plan: Patient was screened for depression during today's encounter  They screened negative with a PHQ-2 score of 0  Return in about 1 year (around 2023)  Chief Complaint:     Chief Complaint   Patient presents with    Annual Exam      History of Present Illness:     Adult Annual Physical   Patient here for a comprehensive physical exam  The patient reports no problems  Pt refuses AAA screening despite family history       Diet and Physical Activity  · Diet/Nutrition: well balanced diet and consuming 3-5 servings of fruits/vegetables daily  · Exercise: walking, moderate cardiovascular exercise, 5-7 times a week on average and 30-60 minutes on average  Strength training     Depression Screening  PHQ-2/9 Depression Screening    Little interest or pleasure in doing things: 0 - not at all  Feeling down, depressed, or hopeless: 0 - not at all  PHQ-2 Score: 0  PHQ-2 Interpretation: Negative depression screen       General Health  · Sleep: sleeps well and gets 7-8 hours of sleep on average  · Hearing: normal - bilateral   · Vision: goes for regular eye exams, most recent eye exam <1 year ago and wears contacts  · Dental: regular dental visits, brushes teeth twice daily and flosses teeth occasionally  /GYN Health  · Patient is: postmenopausal  · Last menstrual period: sometime in her late 52's  · Contraceptive method: post jamie  Review of Systems:     Review of Systems   Constitutional: Negative for fatigue and fever  HENT: Negative for congestion  Eyes: Negative for visual disturbance  Respiratory: Negative for chest tightness and shortness of breath  Cardiovascular: Negative for chest pain and palpitations  Gastrointestinal: Negative for abdominal pain, blood in stool, constipation, diarrhea, nausea and vomiting  Genitourinary: Negative for difficulty urinating, dysuria, frequency, hematuria and pelvic pain  Musculoskeletal: Negative for arthralgias  Neurological: Negative for headaches  Hematological: Does not bruise/bleed easily        Past Medical History:     Past Medical History:   Diagnosis Date    Allergic Not sure    Hives      Past Surgical History:     Past Surgical History:   Procedure Laterality Date    APPENDECTOMY  May 1986     SECTION  1985, 06/15/1987, 1990   [facility]ner, [facility]      Social History:     Social History     Socioeconomic History    Marital status: Single Spouse name: None    Number of children: None    Years of education: None    Highest education level: None   Occupational History    None   Tobacco Use    Smoking status: Never Smoker    Smokeless tobacco: Never Used   Substance and Sexual Activity    Alcohol use: Not Currently     Alcohol/week: 2 0 standard drinks     Types: 2 Glasses of wine per week    Drug use: Never    Sexual activity: Yes     Partners: Male     Birth control/protection: None     Comment: Monogamy   Other Topics Concern    None   Social History Narrative    None     Social Determinants of Health     Financial Resource Strain: Not on file   Food Insecurity: Not on file   Transportation Needs: Not on file   Physical Activity: Not on file   Stress: Not on file   Social Connections: Not on file   Intimate Partner Violence: Not on file   Housing Stability: Not on file      Family History:     Family History   Problem Relation Age of Onset    Cancer Sister         Leukemia    Leukemia Sister 54    Heart attack Mother 80    Abdominal aortic aneurysm Father     Arthritis Sister     No Known Problems Maternal Grandmother     No Known Problems Paternal Grandmother     No Known Problems Maternal Aunt     No Known Problems Maternal Aunt     No Known Problems Maternal Aunt     Breast cancer Neg Hx       Current Medications:     Current Outpatient Medications   Medication Sig Dispense Refill    levothyroxine 50 mcg tablet Take 1 tablet (50 mcg total) by mouth daily 90 tablet 3     No current facility-administered medications for this visit  Allergies: Allergies   Allergen Reactions    Penicillins Hives and Itching      Physical Exam:     /80 (BP Location: Right arm, Patient Position: Sitting, Cuff Size: Standard)   Pulse 102   Temp (!) 97 3 °F (36 3 °C)   Ht 5' 4" (1 626 m)   Wt 68 6 kg (151 lb 3 2 oz)   SpO2 98%   BMI 25 95 kg/m²     Physical Exam  Vitals reviewed     Constitutional:       Appearance: Normal appearance  She is well-developed  HENT:      Right Ear: Tympanic membrane, ear canal and external ear normal       Left Ear: Tympanic membrane, ear canal and external ear normal       Mouth/Throat:      Comments: Pt refused to take her mask off for exam  Eyes:      Extraocular Movements: Extraocular movements intact  Conjunctiva/sclera: Conjunctivae normal       Pupils: Pupils are equal, round, and reactive to light  Cardiovascular:      Rate and Rhythm: Normal rate and regular rhythm  Heart sounds: Normal heart sounds  Pulmonary:      Effort: Pulmonary effort is normal       Breath sounds: Normal breath sounds  Abdominal:      General: Bowel sounds are normal       Palpations: Abdomen is soft  Musculoskeletal:         General: No tenderness  Normal range of motion  Cervical back: Normal range of motion and neck supple  Right lower leg: No edema  Left lower leg: No edema  Lymphadenopathy:      Cervical: No cervical adenopathy  Skin:     General: Skin is warm  Capillary Refill: Capillary refill takes less than 2 seconds  Neurological:      General: No focal deficit present  Mental Status: She is alert and oriented to person, place, and time  Psychiatric:         Mood and Affect: Mood normal          Behavior: Behavior normal          Thought Content:  Thought content normal          Judgment: Judgment normal           Brenda Vasquez MD  [facility]

## 2022-08-29 DIAGNOSIS — E03.9 ACQUIRED HYPOTHYROIDISM: ICD-10-CM

## 2022-08-29 NOTE — PROGRESS NOTES
Mary Jo Verdugo   1955    CC:  Yearly exam    A:  Yearly exam      Problem List Items Addressed This Visit    None     Visit Diagnoses     Encounter for gynecological examination (general) (routine) without abnormal findings    -  Primary    Relevant Orders    Liquid-based pap, screening    Encounter for screening mammogram for malignant neoplasm of breast        Relevant Orders    Mammo screening bilateral w 3d & cad          P:   Pap collected today  We reviewed ASCCP guidelines for Pap testing  Mammo slip given, reviewed guidelines of various organizations   Colon cancer screening due - has at home    RTO one year for yearly exam or sooner as needed  S:  61 y o  female here for yearly exam  She is postmenopausal and has had no vaginal bleeding  She denies vaginal discharge, itching, odor or dryness  Sexual activity: She is sexually active without pain, bleeding or dryness  STD testing: She does not want STD testing today  Menopausal  Last Pap: 8/2021 NILM/HR HPV+    Last Mammo: 9/2021 BIRADS 1  Last Colonoscopy: pt planning on cologuard     No smoking, alcohol, drugs   Exercises irregularly    Family hx of breast cancer: denies  Family hx of ovarian cancer: denies  Family hx of colon cancer: denies      Current Outpatient Medications:     levothyroxine 50 mcg tablet, Take 1 tablet (50 mcg total) by mouth daily, Disp: 90 tablet, Rfl: 3  Social History     Socioeconomic History    Marital status: Single     Spouse name: Not on file    Number of children: Not on file    Years of education: Not on file    Highest education level: Not on file   Occupational History    Not on file   Tobacco Use    Smoking status: Never Smoker    Smokeless tobacco: Never Used   Substance and Sexual Activity    Alcohol use:  Yes     Alcohol/week: 2 0 standard drinks     Types: 2 Glasses of wine per week    Drug use: Never    Sexual activity: Yes     Partners: Male     Birth control/protection: Post-menopausal, Female Sterilization     Comment: Monogamy   Other Topics Concern    Not on file   Social History Narrative    Not on file     Social Determinants of Health     Financial Resource Strain: Not on file   Food Insecurity: Not on file   Transportation Needs: Not on file   Physical Activity: Not on file   Stress: Not on file   Social Connections: Not on file   Intimate Partner Violence: Not on file   Housing Stability: Not on file     Family History   Problem Relation Age of Onset    Cancer Sister         Leukemia    Leukemia Sister 54    Heart attack Mother 80    Abdominal aortic aneurysm Father     Arthritis Sister     No Known Problems Maternal Grandmother     No Known Problems Paternal Grandmother     No Known Problems Maternal Aunt     No Known Problems Maternal Aunt     No Known Problems Maternal Aunt     Breast cancer Neg Hx      Past Medical History:   Diagnosis Date    Allergic Not sure    Hives    Hypothyroidism 06/15/2021    Miscarriage Spring 1989        Review of Systems   Respiratory: Negative  Cardiovascular: Negative  Gastrointestinal: Negative for constipation and diarrhea  Genitourinary: Negative for difficulty urinating, pelvic pain, vaginal bleeding, vaginal discharge, itching or odor  O:  Blood pressure 132/82, height 5' 4" (1 626 m), weight 70 kg (154 lb 6 4 oz)  Patient appears well and is not in distress  Neck is supple without masses  Breasts are symmetrical without mass, tenderness, nipple discharge, skin changes or adenopathy  Abdomen is soft and nontender without masses  Vulva without lesions or rashes  Urethral meatus and urethra are normal  Bladder is normal to palpation  Vagina is normal without discharge or bleeding  Cervix is normal without discharge or lesion  Uterus and adnexa are normal, mobile, nontender without palpable mass    Rectovaginal exam deferred today

## 2022-08-30 ENCOUNTER — ANNUAL EXAM (OUTPATIENT)
Dept: OBGYN CLINIC | Age: 67
End: 2022-08-30
Payer: COMMERCIAL

## 2022-08-30 VITALS
SYSTOLIC BLOOD PRESSURE: 132 MMHG | HEIGHT: 64 IN | DIASTOLIC BLOOD PRESSURE: 82 MMHG | WEIGHT: 154.4 LBS | BODY MASS INDEX: 26.36 KG/M2

## 2022-08-30 DIAGNOSIS — Z01.419 ENCOUNTER FOR GYNECOLOGICAL EXAMINATION (GENERAL) (ROUTINE) WITHOUT ABNORMAL FINDINGS: Primary | ICD-10-CM

## 2022-08-30 DIAGNOSIS — Z12.31 ENCOUNTER FOR SCREENING MAMMOGRAM FOR MALIGNANT NEOPLASM OF BREAST: ICD-10-CM

## 2022-08-30 PROCEDURE — G0476 HPV COMBO ASSAY CA SCREEN: HCPCS | Performed by: STUDENT IN AN ORGANIZED HEALTH CARE EDUCATION/TRAINING PROGRAM

## 2022-08-30 PROCEDURE — S0612 ANNUAL GYNECOLOGICAL EXAMINA: HCPCS | Performed by: STUDENT IN AN ORGANIZED HEALTH CARE EDUCATION/TRAINING PROGRAM

## 2022-08-30 PROCEDURE — G0145 SCR C/V CYTO,THINLAYER,RESCR: HCPCS | Performed by: STUDENT IN AN ORGANIZED HEALTH CARE EDUCATION/TRAINING PROGRAM

## 2022-08-31 RX ORDER — LEVOTHYROXINE SODIUM 0.05 MG/1
50 TABLET ORAL DAILY
Qty: 90 TABLET | Refills: 0 | Status: SHIPPED | OUTPATIENT
Start: 2022-08-31

## 2022-09-02 LAB
LAB AP GYN PRIMARY INTERPRETATION: NORMAL
Lab: NORMAL

## 2022-11-14 ENCOUNTER — HOSPITAL ENCOUNTER (OUTPATIENT)
Dept: MAMMOGRAPHY | Facility: CLINIC | Age: 67
Discharge: HOME | End: 2022-11-14

## 2022-11-14 VITALS — HEIGHT: 64 IN | WEIGHT: 154 LBS | BODY MASS INDEX: 26.29 KG/M2

## 2022-11-14 DIAGNOSIS — Z12.31 ENCOUNTER FOR SCREENING MAMMOGRAM FOR MALIGNANT NEOPLASM OF BREAST: ICD-10-CM

## 2022-11-28 DIAGNOSIS — E03.9 ACQUIRED HYPOTHYROIDISM: ICD-10-CM

## 2022-11-28 RX ORDER — LEVOTHYROXINE SODIUM 0.05 MG/1
50 TABLET ORAL DAILY
Qty: 90 TABLET | Refills: 0 | Status: SHIPPED | OUTPATIENT
Start: 2022-11-28

## 2022-11-30 ENCOUNTER — OFFICE VISIT (OUTPATIENT)
Dept: URGENT CARE | Facility: CLINIC | Age: 67
End: 2022-11-30

## 2022-11-30 VITALS — TEMPERATURE: 98 F | HEART RATE: 99 BPM | RESPIRATION RATE: 18 BRPM | OXYGEN SATURATION: 99 %

## 2022-11-30 DIAGNOSIS — S43.401A SPRAIN OF RIGHT SHOULDER, UNSPECIFIED SHOULDER SPRAIN TYPE, INITIAL ENCOUNTER: Primary | ICD-10-CM

## 2022-11-30 RX ORDER — METHOCARBAMOL 500 MG/1
500 TABLET, FILM COATED ORAL 4 TIMES DAILY
Qty: 30 TABLET | Refills: 0 | Status: SHIPPED | OUTPATIENT
Start: 2022-11-30

## 2022-11-30 NOTE — PROGRESS NOTES
[facility]        NAME: Juan Felix is a 74 y o  female  : 1955    MRN: [id number]  DATE: 2022  TIME: 2:47 PM    Assessment and Plan   Sprain of right shoulder, unspecified shoulder sprain type, initial encounter [S43 401A]  1  Sprain of right shoulder, unspecified shoulder sprain type, initial encounter  methocarbamol (ROBAXIN) 500 mg tablet        Start with ibuprofen for pain  Start on skeletal muscle relaxers given spasms  Follow up with orthopedics if symptoms do not improve  Patient Instructions     -Practice RICE : rest the injured area, apply ice, apply compression such as an ACE wrap to the site, and elevation- keep the injured area up     -For pain take an NSAID such as ibuprofen, Aleve, or motrin if able  This will decrease pain and swelling to the area  If unable to take, can try over the counter Voltaren cream instead  -If pain continues can also take these medications - can try over the counter medications (these do not need a prescription) such as acetaminophen (Tylenol), lidocaine or other pain patches, Voltaren cream, or lidocaine cream      -Follow up with orthopedics  There is an orthopedics site in the same building as the [facility] now call [phone] to schedule an appointment  Chief Complaint     Chief Complaint   Patient presents with   • R shoulder pain     Verbalizes hx of multiple sx  States 5 days now; taking motrin with out relief  States spasms, and radiating pain to neck and arm  History of Present Illness       Presents with pain to right shoulder/neck  Chronic issues with rotator cuff previously followed by ortho and degenerative changes to neck on Xray 2 years ago showing "Straightening of the cervical lordosis with mild retrolisthesis C4 on C5  There is advanced disc space narrowing seen at C3-4, C4-5, C5-6 and C6-7 with small osteophytes   Hypertrophic uncovertebral joint hypertrophy noted on the right at C6 " She reports having surgeries in the past  Acute pain again for 5 days mainly to the right shoulder  No known injury/incident  She did  firewood but she frequently does this without pain  Took 800 mg motrin without relief  She feels 8-10/10 pain with spasms to the right shoulder area, constant even at rest  Worse with trying to reach behind the back, better with elevation  She was unable to drive here due to pain and feels unable to do work with computer mouse  She uses a TENS unit for symptoms  Review of Systems   Review of Systems   Constitutional: Negative for fever  HENT: Negative for congestion  Respiratory: Negative for cough and shortness of breath  Cardiovascular: Negative for chest pain  Musculoskeletal: Positive for back pain, myalgias and neck pain  Skin: Negative for wound  Psychiatric/Behavioral: Negative for confusion           Current Medications       Current Outpatient Medications:   •  methocarbamol (ROBAXIN) 500 mg tablet, Take 1 tablet (500 mg total) by mouth 4 (four) times a day, Disp: 30 tablet, Rfl: 0  •  levothyroxine 50 mcg tablet, Take 1 tablet (50 mcg total) by mouth daily, Disp: 90 tablet, Rfl: 0    Current Allergies     Allergies as of 2022 - Reviewed 2022   Allergen Reaction Noted   • Penicillins Hives and Itching 2021            The following portions of the patient's history were reviewed and updated as appropriate: allergies, current medications, past family history, past medical history, past social history, past surgical history and problem list      Past Medical History:   Diagnosis Date   • Allergic Not sure    Hives   • Hypothyroidism 06/15/2021   • Miscarriage Spring 1989       Past Surgical History:   Procedure Laterality Date   • APPENDECTOMY  May 1986   •  SECTION  1985, 06/15/1987, 1990   • [address], 2006       Family History   Problem Relation Age of Onset   • Cancer Sister         Leukemia   • Leukemia Sister 54   • Heart attack Mother 80   • Abdominal aortic aneurysm Father    • Arthritis Sister    • No Known Problems Maternal Grandmother    • No Known Problems Paternal Grandmother    • No Known Problems Maternal Aunt    • No Known Problems Maternal Aunt    • No Known Problems Maternal Aunt    • Breast cancer Neg Hx          Medications have been verified  Objective   Pulse 99   Temp 98 °F (36 7 °C)   Resp 18   SpO2 99%        Physical Exam     Physical Exam  Vitals reviewed  Constitutional:       Appearance: Normal appearance  Cardiovascular:      Rate and Rhythm: Normal rate and regular rhythm  Pulses: Normal pulses  Heart sounds: Normal heart sounds  No murmur heard  Pulmonary:      Effort: Pulmonary effort is normal  No respiratory distress  Breath sounds: Normal breath sounds  Musculoskeletal:      Right shoulder: Tenderness present  No swelling, deformity, effusion, laceration, bony tenderness or crepitus  Decreased range of motion  Normal strength  Normal pulse  Cervical back: No swelling, edema, deformity, erythema, signs of trauma, lacerations, rigidity, spasms, tenderness, bony tenderness or crepitus  No pain with movement  Normal range of motion  Thoracic back: Normal       Comments: Tenderness to anterior shoulder extending into the scalupa area  Full AROM to all movement except to reaching behind the back to bra line with pain/resistance  Skin:     General: Skin is warm and dry  Capillary Refill: Capillary refill takes less than 2 seconds  Neurological:      General: No focal deficit present  Mental Status: She is alert and oriented to person, place, and time     Psychiatric:         Mood and Affect: Mood normal          Behavior: Behavior normal

## 2022-12-04 DIAGNOSIS — M67.919 DISORDER OF ROTATOR CUFF, UNSPECIFIED LATERALITY: Primary | ICD-10-CM

## 2022-12-04 RX ORDER — METHYLPREDNISOLONE 4 MG/1
TABLET ORAL
Qty: 21 EACH | Refills: 0 | Status: SHIPPED | OUTPATIENT
Start: 2022-12-04

## 2022-12-05 ENCOUNTER — OFFICE VISIT (OUTPATIENT)
Dept: OBGYN CLINIC | Facility: CLINIC | Age: 67
End: 2022-12-05

## 2022-12-05 ENCOUNTER — APPOINTMENT (OUTPATIENT)
Dept: RADIOLOGY | Facility: CLINIC | Age: 67
End: 2022-12-05

## 2022-12-05 VITALS
HEART RATE: 79 BPM | BODY MASS INDEX: 26.43 KG/M2 | DIASTOLIC BLOOD PRESSURE: 94 MMHG | HEIGHT: 64 IN | SYSTOLIC BLOOD PRESSURE: 152 MMHG

## 2022-12-05 DIAGNOSIS — M54.12 CERVICAL RADICULOPATHY: Primary | ICD-10-CM

## 2022-12-05 DIAGNOSIS — M25.511 RIGHT SHOULDER PAIN, UNSPECIFIED CHRONICITY: ICD-10-CM

## 2022-12-05 DIAGNOSIS — M75.41 IMPINGEMENT SYNDROME OF RIGHT SHOULDER: ICD-10-CM

## 2022-12-05 RX ORDER — CYCLOBENZAPRINE HCL 5 MG
5 TABLET ORAL 3 TIMES DAILY PRN
Qty: 15 TABLET | Refills: 0 | Status: SHIPPED | OUTPATIENT
Start: 2022-12-05

## 2022-12-05 NOTE — PROGRESS NOTES
Patient Name:  Sharon Pleitez  MRN:  [id number]    [address] [place] [address]     1  Cervical radiculopathy  -     Ambulatory Referral to Physical Therapy; Future  -     cyclobenzaprine (FLEXERIL) 5 mg tablet; Take 1 tablet (5 mg total) by mouth 3 (three) times a day as needed for muscle spasms    2  Right shoulder pain, unspecified chronicity  -     XR shoulder 2+ vw right; Future; Expected date: 12/05/2022    3  Impingement syndrome of right shoulder  -     Ambulatory Referral to Physical Therapy; Future  -     cyclobenzaprine (FLEXERIL) 5 mg tablet; Take 1 tablet (5 mg total) by mouth 3 (three) times a day as needed for muscle spasms        74 y o  female with Right upper extremity pain with components of shoulder impingement and cervical radiculopathy  X-rays reviewed in office today with patient  At this time I recommend that she take the medrol dose pack from her PCP and I will prescribe her Flexeril to help alleviate her symptoms  Side effects of medication discussed in the office  I have discussed it to prescribe physical therapy in an effort to decrease pain, improve strength, and improve flexibility  I would like to see the patient back in approximately 10 weeks for re-evaluation  We will obtain new XR of cervical spine at next appointment  Possible advanced imaging, injection therapy, or a referral to pain management maybe warranted depending on patient's symptoms  Chief Complaint     Right shoulder pain    History of the Present Illness     Sharon Pleitez is a RHD 74 y o  female with Right shoulder pain that started approximately 1 week ago  Patient denies any injury or trauma that caused her symptoms  She notes her pain is in the posterior aspect of the right shoulder, and her pain radiates throughout the right upper extremity  She reports burning, numbness, and tingling sensations in the right upper extremity   She has been taking 800mg of motrin 3x per day, using hot packs and her TENS unit to help alleviate her symptoms  However, this has only provided her with mild relief  Her pain is increased with reaching behind, computer work, and activities of daily living such as brushing her hair  PMHX of right shoulder surgery: labral repair, rotator cuff repair in 2006  LT shoulder labrum and rotator cuff repair in 1995  Review of Systems     Review of Systems   Constitutional: Positive for activity change  Negative for chills, fever and unexpected weight change  HENT: Negative for hearing loss, nosebleeds and sore throat  Eyes: Negative for pain, redness and visual disturbance  Respiratory: Negative for cough, shortness of breath and wheezing  Cardiovascular: Negative for chest pain, palpitations and leg swelling  Gastrointestinal: Negative for abdominal pain, nausea and vomiting  Endocrine: Negative for polydipsia and polyuria  Genitourinary: Negative for dysuria and hematuria  Musculoskeletal: Positive for myalgias  See HPI   Skin: Negative for rash and wound  Neurological: Negative for dizziness, numbness and headaches  Psychiatric/Behavioral: Negative for decreased concentration and suicidal ideas  The patient is not nervous/anxious  Physical Exam     /94   Pulse 79   Ht 5' 4" (1 626 m)   BMI 26 43 kg/m²     Right Shoulder: Active range of motion   160 degrees forward flexion  160 degrees abduction  50 degrees external rotation   L4 internal rotation        There is tenderness present over the biceps, greater tuberosity, rhomboid, trapezius  There is 5/5 strength with external rotation testing at the side  Empty can testing is negative   Belly press test is negative  Becerra test is positive  Tensas's test is positive   Speed's test is Negative  The patient is neurovascularly intact distally in the extremity  Cervical Spine:   Active range of motion mild restriction rotation to the right  There is no midline tenderness      There is no paraspinal hypertonicity and tenderness  Sensation intact to light touch C5 through T1 dermatomes left upper extremity  Sensation intact to light touch C5 through T1 dermatomes right upper extremity  Left Motor: 5/5 biceps, 5/5 triceps, 5/5 wrist extension, 5/5 finger flexion, 5/5 finger abduction   Right Motor: 5/5 biceps, 4/5 triceps, 5/5 wrist extension, 5/5 finger flexion, 4/5 finger abduction       Eyes:  Anicteric sclerae  Neck:  Supple  Lungs:  Normal respiratory effort  Cardiovascular:  Capillary refill is less than 2 seconds  Skin:  Intact without erythema  Neurologic:  Sensation grossly intact to light touch  Psychiatric:  Mood and affect are appropriate  Data Review     I have personally reviewed pertinent films in PACS, and my interpretation follows:    X-rays taken 22 of Right shoulder demonstrates glenohumeral joint space is well maintained  Mild degenerative changes and osteophyte formation of the glenohumeral joint  Mild AC joint space narrowing  Surgical implants from previous rotator cuff repair are intact, show no signs of implant loosening or interval complications  No fracture or dislocation  No lytic or blastic lesions  XR of the cervical spine taken in  demonstrate moderate, multilevel degenerative changes including joint space narrowing and osteophytosis       Past Medical History:   Diagnosis Date   • Allergic Not sure    Hives   • Hypothyroidism 06/15/2021   • Miscarriage Spring 1989       Past Surgical History:   Procedure Laterality Date   • APPENDECTOMY  May 1986   •  SECTION  1985, 06/15/1987, 1990   • SHOULDER SURGERY  ,        Allergies   Allergen Reactions   • Penicillins Hives and Itching       Current Outpatient Medications on File Prior to Visit   Medication Sig Dispense Refill   • levothyroxine 50 mcg tablet Take 1 tablet (50 mcg total) by mouth daily 90 tablet 0   • methocarbamol (ROBAXIN) 500 mg tablet Take 1 tablet (500 mg total) by mouth 4 (four) times a day 30 tablet 0   • methylPREDNISolone 4 MG tablet therapy pack Use as directed on package 21 each 0     No current facility-administered medications on file prior to visit  Social History     Tobacco Use   • Smoking status: Never   • Smokeless tobacco: Never   Substance Use Topics   • Alcohol use:  Yes     Alcohol/week: 2 0 standard drinks     Types: 2 Glasses of wine per week   • Drug use: Never       Family History   Problem Relation Age of Onset   • Cancer Sister         Leukemia   • Leukemia Sister 54   • Heart attack Mother 80   • Abdominal aortic aneurysm Father    • Arthritis Sister    • No Known Problems Maternal Grandmother    • No Known Problems Paternal Grandmother    • No Known Problems Maternal Aunt    • No Known Problems Maternal Aunt    • No Known Problems Maternal Aunt    • Breast cancer Neg Hx              Procedures Performed     Procedures  none          Scribe Attestation    I,:  Praveen Ann am acting as a scribe while in the presence of the attending physician :       I,:  Teetee Luke DO personally performed the services described in this documentation    as scribed in my presence :

## 2022-12-06 DIAGNOSIS — M25.511 RIGHT SHOULDER PAIN, UNSPECIFIED CHRONICITY: Primary | ICD-10-CM

## 2022-12-06 RX ORDER — LIDOCAINE 50 MG/G
1 PATCH TOPICAL DAILY
Qty: 30 PATCH | Refills: 0 | Status: SHIPPED | OUTPATIENT
Start: 2022-12-06

## 2022-12-09 ENCOUNTER — PATIENT MESSAGE (OUTPATIENT)
Dept: OBGYN CLINIC | Facility: CLINIC | Age: 67
End: 2022-12-09

## 2022-12-12 ENCOUNTER — TELEPHONE (OUTPATIENT)
Dept: OBGYN CLINIC | Facility: HOSPITAL | Age: 67
End: 2022-12-12

## 2022-12-12 NOTE — TELEPHONE ENCOUNTER
Caller: Jorge Lane     Doctor: Cody Rodriguez    Reason for call: returning call from Dr Farhad Hansen     Call back#: [phone]

## 2022-12-13 NOTE — TELEPHONE ENCOUNTER
I spoke to the patient  I discussed her x-ray  She is still having radiation of pain, numbness, and tingling intermittently down to her hand  She is going to try to start physical therapy this week  I instructed her to call back if she is not doing any better or worse over the course of the next 2 weeks and we will try to get her in sooner for possible evaluation and imaging of her cervical spine

## 2022-12-14 ENCOUNTER — EVALUATION (OUTPATIENT)
Dept: PHYSICAL THERAPY | Facility: CLINIC | Age: 67
End: 2022-12-14

## 2022-12-14 ENCOUNTER — TELEPHONE (OUTPATIENT)
Dept: OBGYN CLINIC | Facility: HOSPITAL | Age: 67
End: 2022-12-14

## 2022-12-14 DIAGNOSIS — M25.511 ACUTE PAIN OF RIGHT SHOULDER: ICD-10-CM

## 2022-12-14 DIAGNOSIS — M54.12 CERVICAL RADICULOPATHY: Primary | ICD-10-CM

## 2022-12-14 DIAGNOSIS — M75.41 IMPINGEMENT SYNDROME OF RIGHT SHOULDER: ICD-10-CM

## 2022-12-14 NOTE — PROGRESS NOTES
PT Evaluation     Today's date: 2022  Patient name: Dexter Vuong  : 1955  MRN: [id number]  Referring provider: Patience Santamaria DO  Dx:   Encounter Diagnosis     ICD-10-CM    1  Cervical radiculopathy  M54 12 Ambulatory Referral to Physical Therapy      2  Acute pain of right shoulder  M25 511       3  Impingement syndrome of right shoulder  M75 41 Ambulatory Referral to Physical Therapy          Start Time: 0800  Stop Time: 0850  Total time in clinic (min): 50 minutes    Assessment  Assessment details: Pt is a 74 y o  female presenting to PT services with c/o R shoulder pain  Pt has signs and symptoms suggestive of both cervical radiculopathy and subscapularis involvement evidenced by positive distraction test and centralization of symptoms with repeated cervical protraction, as well as positive lift off test and pain with resistance, respectively  Pt has RUE strength WNL, however, has pain with resisted flexion, abduction, and IR  Pt has limited R shoulder IR AROM, and normal but painful AROM in all other planes  Pt has impaired R hand  strength in comparison to L hand  Pt has limited R cervical lateral flexion AROM  PT added repeated cervical protraction, IR stretch, and scapular wall slides to pt's HEP, pt verbalized and demonstrated understanding of proper form  Pt is a good candidate for skilled physical therapy in order to decrease RUE pain, improve R hand  strength, improve cervical mobility, and increase functional ability  Impairments: abnormal or restricted ROM, activity intolerance, impaired physical strength, lacks appropriate home exercise program, pain with function and scapular dyskinesis    Goals  STG (3 weeks):  1  Pt will improve R cervical lateral flexion to be symmetrical to L   2  Pt will report no numbness/tingling into R hand   3  Pt will report pain at worst as 7/10 or less     LTG (6 weeks):  1   Pt will be independent in HEP  2  Pt will improve R hand  Post-Op Instructions: The patient was instructed in the proper use of post-operative eye drops: Omni in the Surgical eye 3x a day for 3 weeks, then discontinue. Call back instructions, retinal detachment and endophthalmitis precautions given. strength to be symmetrical to L hand   3  Pt will report no radicular symptoms in the R arm   4  Pt will report pain at worst as 3/10 or less     Plan  Plan details: 2-1x/week due to schedule conflicts   Patient would benefit from: skilled physical therapy  Planned modality interventions: cryotherapy, TENS, thermotherapy: hydrocollator packs and unattended electrical stimulation  Other planned modality interventions: IASTM, MFDc, Kinesiotape  Planned therapy interventions: abdominal trunk stabilization, massage, manual therapy, joint mobilization, patient education, neuromuscular re-education, strengthening, stretching, therapeutic activities, therapeutic exercise, home exercise program, flexibility and functional ROM exercises  Frequency: 2-1x/week  Duration in weeks: 6  Plan of Care beginning date: 2022  Plan of Care expiration date: 2023  Treatment plan discussed with: patient        Subjective Evaluation    History of Present Illness  Mechanism of injury: Pt states that her R shoulder has always had more pain than her L shoulder  She states that she would experience numbness in her hand at night for years  She states that when her pain started it was in her shoulder blade and the muscles felt like they were squeezing the bone, and she feels achy in her R arm  This pain started on  but by the  was severe  Pt states that the pain does wake her up from her sleep at night and she is able to reposition and fall back asleep about 30 minutes later  She states that she has tightness in the morning and then pain throughout the day  Past surgical hx: R shoulder RTC, labrum repair, biceps tenodesis      Pain  Current pain ratin  At best pain ratin  Pain scale at highest: 10+  Location: R scapula, R brachia R forearm   Quality: throbbing, pulling and needle-like (intense)  Relieving factors: heat and support (putting arm up overhead, Medrol)  Exacerbated by: laying on R side, holding shoulder out using mouse, IR   Progression: no change    Social Support  Steps to enter house: no  Stairs in house: yes (6+6 steps, 1 hand rail)   Lives in: multiple-level home  Lives with: alone    Employment status: working (LCSW, 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[employer])  Hand dominance: right      Diagnostic Tests  Abnormal x-ray: R shoulder normal   Treatments  Previous treatment: medication  Patient Goals  Patient goals for therapy: decreased pain, increased motion and increased strength  Patient goal: "to reduce the pain"        Objective     Active Range of Motion   Cervical/Thoracic Spine       Cervical    Flexion:  WFL and with pain  Extension:  WFL and with pain  Left lateral flexion: 25 degrees      Right lateral flexion: 18 degrees     with pain  Left rotation:  WFL  Right [facility]  Left Shoulder   Normal active range of motion  External rotation BTH: T1   Internal rotation BTB: L4     Right Shoulder   Flexion: WFL and with pain  Abduction: WFL and with pain  External rotation BTH: T1   Internal rotation BTB: L5 with pain    Strength/Myotome Testing     Left Shoulder     Planes of Motion   Flexion: 5   Abduction: 5   External rotation at 0°: 5   Internal rotation at 0°: 5     Right Shoulder     Planes of Motion   Flexion: 5 (pain)   Abduction: 5 (pain)   External rotation at 0°: 5 (pain)   Internal rotation at 0°: 4+ (pain)     Left Elbow   Flexion: 5  Extension: 5    Right Elbow   Flexion: 5 (pain)  Extension: 3+ (pain)    Left Wrist/Hand   Thumb extension: 5    Right Wrist/Hand   Thumb extension: 5    Tests   Cervical   Positive cervical distraction test     Right   Negative Spurling's Test A  Right Shoulder   Positive empty can, Hawkin's, lift-off and Neer's  Negative belly press, external rotation lag sign and Speed's       Additional Tests Details  Repeated retraction peripheralized symptoms   Repeated protraction centralized symptoms      L: 67 lbs   R: 42 9 lbs             Precautions: R RTC repair/biceps tenodesis 2006  Access Code: [email or web address]       Manuals 12/14            Cervical distraction             Cervical side glides                                        Neuro Re-Ed             Scapular wall slides 10"x2x10            Ball on wall                                                                              Ther Ex             UBE             Repeated protraction  3x10            IR stretch 15"x5            Shoulder ER/IR                                                                 Ther Activity                                       Gait Training                                       Modalities

## 2022-12-14 NOTE — TELEPHONE ENCOUNTER
Caller: [facility] Files     Doctor: Ava Johnson    Reason for call: Patient called for status of prior auth on  Lidocaine patches ordered on 12/06      Please call patient and advise   Call back#: [phone]

## 2022-12-15 NOTE — TELEPHONE ENCOUNTER
Patient advised that there are only 2 reasons in the outpatient arena that the lidoderm patches are covered thru insurance  Herpatic Neuralgia and Diabetic Neuropathy  Patient states she will pay out of pocket for them if not covered by her insurance  Can prior auth be placed for this patient?

## 2022-12-19 ENCOUNTER — OFFICE VISIT (OUTPATIENT)
Dept: PHYSICAL THERAPY | Facility: CLINIC | Age: 67
End: 2022-12-19

## 2022-12-19 DIAGNOSIS — M54.12 CERVICAL RADICULOPATHY: Primary | ICD-10-CM

## 2022-12-19 DIAGNOSIS — M75.41 IMPINGEMENT SYNDROME OF RIGHT SHOULDER: ICD-10-CM

## 2022-12-19 DIAGNOSIS — M25.511 ACUTE PAIN OF RIGHT SHOULDER: ICD-10-CM

## 2022-12-19 NOTE — PROGRESS NOTES
Daily Note     Today's date: 2022  Patient name: Gwen Stiles  : 1955  MRN: [id number]  Referring provider: Rylee Montano DO  Dx:   Encounter Diagnosis     ICD-10-CM    1  Cervical radiculopathy  M54 12       2  Acute pain of right shoulder  M25 511       3  Impingement syndrome of right shoulder  M75 41           Start Time: 1145  Stop Time: 1230  Total time in clinic (min): 45 minutes    Subjective: Pt reports that she has been in a lot of pain and not sleeping well  She states that she has been doing her home exercises consistently  Objective: See treatment diary below      Assessment: Tolerated treatment well  PT incorporated a pin and stretch subscapularis release and pt responded favorably with decreased pain and tightness post performance, will assess prolonged response NV  PT may consider incorporating manual cervical retraction with extension and lateral flexion NV  Pt able to complete all charted interventions within tolerance  Patient demonstrated fatigue post treatment, exhibited good technique with therapeutic exercises and would benefit from continued PT      Plan: Continue per plan of care  Progress treatment as tolerated         Precautions: R RTC repair/biceps tenodesis   Access Code: [email or web address]       Manuals            Cervical distraction             Cervical side glides              Serratus release  Pin and stretch SC                        Neuro Re-Ed             Scapular wall slides 10"x2x10 2x10           Ball on wall CW/CCW/ ABCs  RMB 30x/1x           Wall push up plus  2x10           Rows  YTB 2x10                                                  Ther Ex             UBE  3'/3'           Repeated protraction  3x10            IR stretch 15"x5            Shoulder ER/IR  YTB 2x10 ea           B/l shoulder ext  YTB 2x10           TRX shoulder flexion stretch  15"x5                                     Ther Activity             Overhead carry  RMB 3 laps Unilateral dirty baby carry  RMB 3 laps            Gait Training                                       Modalities

## 2022-12-23 ENCOUNTER — OFFICE VISIT (OUTPATIENT)
Dept: PHYSICAL THERAPY | Facility: CLINIC | Age: 67
End: 2022-12-23

## 2022-12-23 DIAGNOSIS — M54.12 CERVICAL RADICULOPATHY: Primary | ICD-10-CM

## 2022-12-23 DIAGNOSIS — M25.511 ACUTE PAIN OF RIGHT SHOULDER: ICD-10-CM

## 2022-12-23 DIAGNOSIS — M75.41 IMPINGEMENT SYNDROME OF RIGHT SHOULDER: ICD-10-CM

## 2022-12-23 NOTE — PROGRESS NOTES
Daily Note     Today's date: 2022  Patient name: Miguelito Hammer  : 1955  MRN: [id number]  Referring provider: Zeenat Mancia DO  Dx:   Encounter Diagnosis     ICD-10-CM    1  Cervical radiculopathy  M54 12       2  Acute pain of right shoulder  M25 511       3  Impingement syndrome of right shoulder  M75 41           Start Time: 0759  Stop Time: 0845  Total time in clinic (min): 46 minutes    Subjective: Pt reports that after last session she felt good for a few days and then this morning she woke up with pain again  She states she is not sleeping well still  Objective: See treatment diary below      Assessment: PT trialed seated cervical retraction with R lateral side bending, seated cervical retraction with R rotation, and seated cervical retraction with L rotation, all of which increased radicular symptoms  Pt continues to respond favorably to manual therapy with decreased pain and improved R shoulder ROM post performance  PT will update pt's HEP NV  Plan: Continue per plan of care  Progress treatment as tolerated         Precautions: R RTC repair/biceps tenodesis   Access Code: [email or web address]       Manuals           Cervical distraction             Cervical side glides              Serratus release  Pin and stretch SC Pin and stretch SC     TPR SC          MFDc   RUT, periscap SC - gliding    + 10"x10 RUT stretch c cup          Neuro Re-Ed             Scapular wall slides 10"x2x10 2x10           Ball on wall CW/CCW/ ABCs  RMB 30x/1x           Wall push up plus  2x10 3"x20          Rows  YTB 2x10           No monies   RTB 30x          Lat stretch "robots"   5"x15                       Ther Ex             UBE  3'/3' 3'/3'          Repeated protraction  3x10            Trialed repeated motions   retraction c R lat flex x30    retraction c R rotation x30          IR stretch 15"x5            Shoulder ER/IR  YTB 2x10 ea           B/l shoulder ext  YTB 2x10           TRX shoulder flexion stretch  15"x5 15"x5                                    Ther Activity             Overhead carry  RMB 3 laps           Unilateral dirty baby carry  RMB 3 laps            Gait Training                                       Modalities

## 2022-12-27 ENCOUNTER — APPOINTMENT (OUTPATIENT)
Dept: PHYSICAL THERAPY | Facility: CLINIC | Age: 67
End: 2022-12-27

## 2023-01-04 ENCOUNTER — OFFICE VISIT (OUTPATIENT)
Dept: PHYSICAL THERAPY | Facility: CLINIC | Age: 68
End: 2023-01-04

## 2023-01-04 DIAGNOSIS — M54.12 CERVICAL RADICULOPATHY: Primary | ICD-10-CM

## 2023-01-04 DIAGNOSIS — M25.511 ACUTE PAIN OF RIGHT SHOULDER: ICD-10-CM

## 2023-01-04 DIAGNOSIS — M75.41 IMPINGEMENT SYNDROME OF RIGHT SHOULDER: ICD-10-CM

## 2023-01-04 NOTE — PROGRESS NOTES
Daily Note     Today's date: 2023  Patient name: Chastity Sidhu  : 1955  MRN: [id number]  Referring provider: Jesus Casanova DO  Dx:   Encounter Diagnosis     ICD-10-CM    1  Cervical radiculopathy  M54 12       2  Acute pain of right shoulder  M25 511       3  Impingement syndrome of right shoulder  M75 41           Start Time: 0800  Stop Time: 0844  Total time in clinic (min): 44 minutes    Subjective: Pt reports that she is not getting the cramping in her shoulder as much, but she still has pain and isn't able to sleep on her side  She states that she performed her HEP prior to coming to PT  Objective: See treatment diary below      Assessment: Pt has shoulder hiking compensation with sustained weighted hold at 90* of shoulder forward flexion, mildly improved with cueing for scapular stabilization  PT focused on strengthening and stabilization, pt was sufficiently fatigued  PT updated pt's HEP to include rows, shoulder IR/ER with resistance, pt verbalized and demonstrated understanding of proper form  Pt will continue to benefit from skilled PT in order to improve scapular mechanics, strengthen RTC, decrease pain and improve functional tolerance  Plan: Continue per plan of care  Progress treatment as tolerated         Precautions: R RTC repair/biceps tenodesis   Access Code: [email or web address]       Manuals          Cervical distraction             Cervical side glides              Serratus release  Pin and stretch SC Pin and stretch SC     TPR SC          MFDc   RUT, periscap SC - gliding    + 10"x10 RUT stretch c cup          Neuro Re-Ed             Scapular wall slides 10"x2x10 2x10           Ball on wall CW/CCW/ ABCs  RMB 30x/1x  RMB 30x/1x         Wall push up plus  2x10 3"x20          Rows  YTB 2x10  RTB 2x10         No monies   RTB 30x RTB x30         Lat stretch "robots"   5"x15 5"x15         BodyBlade     15"x4 IR/ER up/down elbow at side         Serratus punch 2# 2x10         Ther Ex             UBE  3'/3' 3'/3' 3'/3'         Repeated protraction  3x10            Trialed repeated motions   retraction c R lat flex x30    retraction c R rotation x30          IR stretch 15"x5            Shoulder ER/IR  YTB 2x10 ea  RTB 2x10 ea         B/l shoulder ext  YTB 2x10  RTB 2x10         TRX shoulder flexion stretch  15"x5 15"x5 15"x5         Sleeper stretch    30"x3          S/L shoulder ER    2# 2x10         Ther Activity             Overhead carry  RMB 3 laps  RMB 4 laps         Unilateral dirty baby carry  RMB 3 laps   RMB 4 laps         Gait Training                                       Modalities                                                 Be nice because she is doing an initial evaluation

## 2023-01-11 ENCOUNTER — OFFICE VISIT (OUTPATIENT)
Dept: PHYSICAL THERAPY | Facility: CLINIC | Age: 68
End: 2023-01-11

## 2023-01-11 DIAGNOSIS — M75.41 IMPINGEMENT SYNDROME OF RIGHT SHOULDER: ICD-10-CM

## 2023-01-11 DIAGNOSIS — M25.511 ACUTE PAIN OF RIGHT SHOULDER: Primary | ICD-10-CM

## 2023-01-11 DIAGNOSIS — M54.16 LUMBAR RADICULOPATHY: ICD-10-CM

## 2023-01-11 DIAGNOSIS — M54.12 CERVICAL RADICULOPATHY: ICD-10-CM

## 2023-01-11 NOTE — PROGRESS NOTES
Daily Note     Today's date: 2023  Patient name: Varun Gibson  : 1955  MRN: [id number]  Referring provider: Rodrigo Cuellar DO  Dx:   Encounter Diagnosis     ICD-10-CM    1  Acute pain of right shoulder  M25 511       2  Cervical radiculopathy  M54 12       3  Impingement syndrome of right shoulder  M75 41       4  Lumbar radiculopathy  M54 16           Start Time: 8912  Stop Time:   Total time in clinic (min): 49 minutes    Subjective: Pt states that the cramping in her shoulder has gotten better, she reports that sleeping has improved but she still needs to take Z-quil for pain to be able to sleep  Pt reports she did her HEP this morning already  Objective: See treatment diary below      Assessment:  Pt responded favorably to manual cervical distraction, will incorporate mechanical traction NV  Shoulder hiking with sustained 90* of shoulder flexion remains, able to correct with cueing  Pt will continue to benefit from skilled PT in order to improve scapular mechanics, strengthen RTC, decrease pain and improve functional tolerance  Plan: Continue per plan of care  Progress treatment as tolerated         Precautions: R RTC repair/biceps tenodesis   Access Code: [email or web address]       Manuals         Cervical distraction     SC c towel         Cervical side glides              Serratus release  Pin and stretch SC Pin and stretch SC     TPR SC          MFDc   RUT, periscap SC - gliding    + 10"x10 RUT stretch c cup          Neuro Re-Ed             Scapular wall slides 10"x2x10 2x10           Ball on wall CW/CCW/ ABCs  RMB 30x/1x  RMB 30x/1x RMB 30x/1x        Wall push up plus  2x10 3"x20          Rows  YTB 2x10  RTB 2x10         No monies   RTB 30x RTB x30 GTB 3"x20        Lat stretch "robots"   5"x15 5"x15         BodyBlade     15"x4 IR/ER up/down elbow at side 30"x2 IR/ER up/down elbow at side  IR behind back        Serratus punch    2# 2x10 3# [id number] Ther Ex             UBE  3'/3' 3'/3' 3'/3' 3'/3'        Repeated protraction  3x10            Trialed repeated motions   retraction c R lat flex x30    retraction c R rotation x30          IR stretch 15"x5            Shoulder ER/IR  YTB 2x10 ea  RTB 2x10 ea         B/l shoulder ext  YTB 2x10  RTB 2x10         TRX shoulder flexion stretch  15"x5 15"x5 15"x5 15"x5        Sleeper stretch    30"x3  30"x3        S/L shoulder ER    2# 2x10 3# 2x10        Scaption     Supine 3# 2x10        Ther Activity             Overhead carry  RMB 3 laps  RMB 4 laps RMB 6 laps around table        Unilateral dirty baby carry  RMB 3 laps   RMB 4 laps RMB 4 laps around center table        Gait Training                                       Modalities                                                 Be nice because she is doing an initial evaluation

## 2023-01-18 ENCOUNTER — OFFICE VISIT (OUTPATIENT)
Dept: PHYSICAL THERAPY | Facility: CLINIC | Age: 68
End: 2023-01-18

## 2023-01-18 DIAGNOSIS — M54.16 LUMBAR RADICULOPATHY: ICD-10-CM

## 2023-01-18 DIAGNOSIS — M75.41 IMPINGEMENT SYNDROME OF RIGHT SHOULDER: ICD-10-CM

## 2023-01-18 DIAGNOSIS — M54.12 CERVICAL RADICULOPATHY: ICD-10-CM

## 2023-01-18 DIAGNOSIS — M25.511 ACUTE PAIN OF RIGHT SHOULDER: Primary | ICD-10-CM

## 2023-01-18 NOTE — PROGRESS NOTES
Daily Note     Today's date: 2023  Patient name: Jayson Smith  : 1955  MRN: [id number]  Referring provider: Travis Melton DO  Dx:   Encounter Diagnosis     ICD-10-CM    1  Acute pain of right shoulder  M25 511       2  Impingement syndrome of right shoulder  M75 41       3  Cervical radiculopathy  M54 12       4  Lumbar radiculopathy  M54 16                      Subjective: Pt reports her R shoulder is feeling the same  She reports compliance with HEP  Objective: See treatment diary below      Assessment: Pt does experiencing pinching with prone horiz abd c ER, completed with palm down  Lateral R shoulder pain with scapular wall slides with added resistance  Prior to initiation of mechanical traction, pt did not experience pain in R shoulder  Monitor response nv  Traction not completed secondary to patient having to leave for work  Plan: Continue per plan of care        Precautions: R RTC repair/biceps tenodesis   Access Code: [email or web address]       Manuals        Cervical distraction     SC c towel         Cervical side glides              Serratus release  Pin and stretch SC Pin and stretch SC     TPR SC          MFDc   RUT, periscap SC - gliding    + 10"x10 RUT stretch c cup          Neuro Re-Ed             Scapular wall slides 10"x2x10 2x10    RTB 2x10       Ball on wall CW/CCW/ ABCs  RMB 30x/1x  RMB 30x/1x RMB 30x/1x        Wall push up plus  2x10 3"x20          Rows  YTB 2x10  RTB 2x10         No monies   RTB 30x RTB x30 GTB 3"x20        Lat stretch "robots"   5"x15 5"x15         BodyBlade     15"x4 IR/ER up/down elbow at side 30"x2 IR/ER up/down elbow at side  IR behind back 15"x3 IR/ER up/down & IR BTB       Serratus punch    2# 2x10 3# 3"x2x10 3# 2x20       scap wall clock      RTB x10       Prone I T      2x10       Ther Ex             UBE  3'/3' 3'/3' 3'/3' 3'/3' 3'/3'       Repeated protraction  3x10            Trialed repeated motions retraction c R lat flex x30    retraction c R rotation x30          IR stretch 15"x5            Shoulder ER/IR  YTB 2x10 ea  RTB 2x10 ea         B/l shoulder ext  YTB 2x10  RTB 2x10         TRX shoulder flexion stretch  15"x5 15"x5 15"x5 15"x5        Sleeper stretch    30"x3  30"x3 30"x3       S/L shoulder ER    2# 2x10 3# 2x10 3# 2x10       Scaption     Supine 3# 2x10 Supine 3# 2x10       S/l ABD      2# 2x10       Ther Activity             Overhead carry  RMB 3 laps  RMB 4 laps RMB 6 laps around table        Unilateral dirty baby carry  RMB 3 laps   RMB 4 laps RMB 4 laps around center table        Gait Training                                       Modalities             Mechanical c/s traction      6' 18# max static

## 2023-01-25 ENCOUNTER — EVALUATION (OUTPATIENT)
Dept: PHYSICAL THERAPY | Facility: CLINIC | Age: 68
End: 2023-01-25

## 2023-01-25 DIAGNOSIS — M54.12 CERVICAL RADICULOPATHY: ICD-10-CM

## 2023-01-25 DIAGNOSIS — M75.41 IMPINGEMENT SYNDROME OF RIGHT SHOULDER: ICD-10-CM

## 2023-01-25 DIAGNOSIS — M25.511 ACUTE PAIN OF RIGHT SHOULDER: Primary | ICD-10-CM

## 2023-01-25 DIAGNOSIS — M54.16 LUMBAR RADICULOPATHY: ICD-10-CM

## 2023-01-25 NOTE — PROGRESS NOTES
PT Discharge    Today's date: 2023  Patient name: Ryan Ron  : 1955  MRN: [id number]  Referring provider: Desiree Pulido DO  Dx:   Encounter Diagnosis     ICD-10-CM    1  Acute pain of right shoulder  M25 511       2  Impingement syndrome of right shoulder  M75 41       3  Cervical radiculopathy  M54 12       4  Lumbar radiculopathy  M54 16           Start Time: 0800  Stop Time: 08  Total time in clinic (min): 26 minutes    Assessment  Assessment details: Pt is a 74 y o  female presenting to PT services with c/o R shoulder pain  Pt has history of cervical disc degeneration per previous cervical x-ray, and evidenced with clinical symptoms of weakness in the C7 myotome, as well as radicular symptoms in R hand, however, pt also has signs and symptoms suggestive of R subscapularis involvement, evidenced by positive lift off test  Pt has been participating in PT for 6 weeks and has made improvement in regards to R periscapular muscle tension and improved R shoulder strength  However, pt continues to have pain with global R shoulder AROM and weakness with R shoulder IR and ER  PT and pt have discussed and agreed that pt will continue to benefit from performance of HEP and follow up with referring provider for further guidance  Pt was informed that if she has any questions or concerns she is welcome to contact the facility  Pt is discharged from skilled physical therapy at this time  Goals  STG (3 weeks):  1  Pt will improve R cervical lateral flexion to be symmetrical to L ONGOING  2  Pt will report no numbness/tingling into R hand ONGOING  3  Pt will report pain at worst as 7/10 or less ONGOING    LTG (6 weeks):  1  Pt will be independent in HEP GOAL MET  2  Pt will improve R hand  strength to be symmetrical to L hand ONGOING  3  Pt will report no radicular symptoms in the R arm ONGOING  4   Pt will report pain at worst as 3/10 or less ONGOING    Plan  Therapy options: independent HEP and follow up with referring provider  Planned therapy interventions: home exercise program  Treatment plan discussed with: patient        Subjective Evaluation    History of Present Illness  Mechanism of injury: Pt reports that the spasm in her shoulder blade has calmed down, but everything else is pretty much the same  Pt states that the pinching/impingement and the pain is still there  She states that she still has the tingling in her hand, but this is unrelated to her new onset of shoulder pain  Pain  Current pain ratin  At best pain ratin  Pain scale at highest: 8  Location: Anterior shoulder joint  Quality: throbbing, pulling and needle-like (intense)  Relieving factors: heat and support (putting arm up overhead)  Exacerbated by: laying on R side, holding shoulder out using mouse, IR   Symptom course: a little bit better      Social Support  Steps to enter house: no  Stairs in house: yes (6+6 steps, 1 hand rail)   Lives in: multiple-level home  Lives with: alone    Employment status: working (LCSW, 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[employer])  Hand dominance: right      Diagnostic Tests  Abnormal x-ray: R shoulder normal   Treatments  Previous treatment: medication  Patient Goals  Patient goals for therapy: decreased pain, increased motion and increased strength  Patient goal: "to reduce the pain"        Objective     Active Range of Motion   Cervical/Thoracic Spine       Cervical    Flexion:  WFL  Extension:  WFL and with pain  Left lateral flexion: 20 (stretch) degrees      Right lateral flexion: 26 degrees      Left rotation:  WFL  Right rotation:  Restriction level: minimal  Left Shoulder   Normal active range of motion  External rotation BTH: T1   Internal rotation BTB: T11     Right Shoulder   Flexion: WFL and with pain  Abduction: WFL and with pain  External rotation BTH: T1 with pain  Internal rotation BTB: L4 with pain    Strength/Myotome Testing     Left Shoulder     Planes of Motion   Flexion: 5 Abduction: 5   External rotation at 0°: 5   Internal rotation at 0°: 5     Right Shoulder     Planes of Motion   Flexion: 5 (pain)   Abduction: 5 (pain)   External rotation at 0°: 5 (pain)   Internal rotation at 0°: 5 (pain)     Left Elbow   Flexion: 5  Extension: 5    Right Elbow   Flexion: 5 (pain)  Extension: 4- (pain)    Left Wrist/Hand   Thumb extension: 5    Right Wrist/Hand   Thumb extension: 5    Tests   Cervical   Positive cervical distraction test     Right   Negative Spurling's Test A  Right Shoulder   Positive empty can, Hawkin's, lift-off and Neer's  Negative belly press, external rotation lag sign and Speed's       Additional Tests Details   L: 64 3 lbs   R: 53 1 lbs         Precautions: R RTC repair/biceps tenodesis 2006  Access Code: [email or web address]       Manuals 12/14 12/19 12/23 1/4 1/11 1/18 1/25      Cervical distraction     SC c towel         Cervical side glides              Serratus release  Pin and stretch SC Pin and stretch SC     TPR SC          MFDc   RUT, periscap SC - gliding    + 10"x10 RUT stretch c cup          Neuro Re-Ed             Scapular wall slides 10"x2x10 2x10    RTB 2x10       Ball on wall CW/CCW/ ABCs  RMB 30x/1x  RMB 30x/1x RMB 30x/1x        Wall push up plus  2x10 3"x20          Rows  YTB 2x10  RTB 2x10         No monies   RTB 30x RTB x30 GTB 3"x20        Lat stretch "robots"   5"x15 5"x15         BodyBlade     15"x4 IR/ER up/down elbow at side 30"x2 IR/ER up/down elbow at side  IR behind back 15"x3 IR/ER up/down & IR BTB       Serratus punch    2# 2x10 3# 3"x2x10 3# 2x20       scap wall clock      RTB x10       Prone I T      2x10       Ther Ex             Pt edu       SC - re-evcal findings, POC, HEP      UBE  3'/3' 3'/3' 3'/3' 3'/3' 3'/3'       Repeated protraction  3x10            Trialed repeated motions   retraction c R lat flex x30    retraction c R rotation x30          IR stretch 15"x5            Shoulder ER/IR  YTB 2x10 ea  RTB 2x10 ea         B/l shoulder ext  YTB 2x10  RTB 2x10         TRX shoulder flexion stretch  15"x5 15"x5 15"x5 15"x5        Sleeper stretch    30"x3  30"x3 30"x3       S/L shoulder ER    2# 2x10 3# 2x10 3# 2x10       Scaption     Supine 3# 2x10 Supine 3# 2x10       S/l ABD      2# 2x10       Ther Activity             Overhead carry  RMB 3 laps  RMB 4 laps RMB 6 laps around table        Unilateral dirty baby carry  RMB 3 laps   RMB 4 laps RMB 4 laps around center table        Gait Training                                       Modalities             Mechanical c/s traction      6' 18# max static

## 2023-01-30 ENCOUNTER — OFFICE VISIT (OUTPATIENT)
Dept: OBGYN CLINIC | Facility: CLINIC | Age: 68
End: 2023-01-30

## 2023-01-30 VITALS
DIASTOLIC BLOOD PRESSURE: 91 MMHG | HEART RATE: 85 BPM | WEIGHT: 154 LBS | SYSTOLIC BLOOD PRESSURE: 152 MMHG | HEIGHT: 64 IN | BODY MASS INDEX: 26.29 KG/M2

## 2023-01-30 DIAGNOSIS — M75.41 IMPINGEMENT SYNDROME OF RIGHT SHOULDER: ICD-10-CM

## 2023-01-30 DIAGNOSIS — M54.12 CERVICAL RADICULOPATHY: Primary | ICD-10-CM

## 2023-01-30 DIAGNOSIS — M25.511 RIGHT SHOULDER PAIN, UNSPECIFIED CHRONICITY: ICD-10-CM

## 2023-01-30 NOTE — PROGRESS NOTES
Patient Name:  Ryan Ron  MRN:  [id number]    [address] [place] [address]     1  Cervical radiculopathy    2  Impingement syndrome of right shoulder    3  Right shoulder pain, unspecified chronicity        74 y o  female with Right shoulder impingement syndrome, cervical radiculopathy  The patient displays overall improvement with formal PT and home exercise program  The patient was instructed to continue with her home exercise plan  At this time I will see the patient back on an as needed basis if her symptoms fail to improve or worsen  History of the Present Illness   Ryna Ron is a 74 y o  female with Right shoulder impingement syndrome, cervical radiculopathy  The patient was last seen in the office on 12/5/22 where she was referred to physical therapy and provided Flexeril  Today the patient reports she has 60% improvement in her symptoms for her shoulder  She reports she has on and off numbness and tingling still  Her muscle spasms have subsided  She took the muscle relaxer for 1 week  She is continuing with her home exercise plan  Review of Systems     Review of Systems   Constitutional: Negative for appetite change and unexpected weight change  HENT: Negative for congestion and trouble swallowing  Eyes: Negative for visual disturbance  Respiratory: Negative for cough and shortness of breath  Cardiovascular: Negative for chest pain and palpitations  Gastrointestinal: Negative for nausea and vomiting  Endocrine: Negative for cold intolerance and heat intolerance  Musculoskeletal: Negative for joint swelling and myalgias  Skin: Negative for rash  Neurological: Negative for numbness  Physical Exam     /91 (BP Location: Right arm)   Pulse 85   Ht 5' 4" (1 626 m)   Wt 69 9 kg (154 lb)   BMI 26 43 kg/m²     Right Shoulder:    Active range of motion   160 degrees forward flexion  170 degrees abduction  40 degrees external rotation   Lower thoracic internal rotation Passive range of motion   170 degrees of forward flexion     There is 5/5 strength with external rotation testing at the side  Empty can testing is negative   Belly press test is negative  Becerra test is positive  Kenyon's test is negative    Speed's test is Negative  The patient is neurovascularly intact distally in the extremity  Data Review     I have personally reviewed pertinent films in PACS, and my interpretation follows  X-rays taken 12/5/22 of Right shoulder demonstrates glenohumeral joint space is well maintained  Mild degenerative changes and osteophyte formation of the glenohumeral joint  Mild AC joint space narrowing  Surgical implants from previous rotator cuff repair are intact, show no signs of implant loosening or interval complications  No fracture or dislocation  No lytic or blastic lesions  XR of the cervical spine taken in 2020 demonstrate moderate, multilevel degenerative changes including joint space narrowing and osteophytosis  Social History     Tobacco Use   • Smoking status: Never   • Smokeless tobacco: Never   Substance Use Topics   • Alcohol use: Yes     Alcohol/week: 2 0 standard drinks     Types: 2 Glasses of wine per week   • Drug use: Never           Procedures  None performed       Randall Becker DO   Scribe Attestation    I,:  Wale Benz am acting as a scribe while in the presence of the attending physician :       I,:  Randall Becker DO personally performed the services described in this documentation    as scribed in my presence :

## 2023-02-24 DIAGNOSIS — E03.9 ACQUIRED HYPOTHYROIDISM: ICD-10-CM

## 2023-02-24 RX ORDER — LEVOTHYROXINE SODIUM 0.05 MG/1
50 TABLET ORAL DAILY
Qty: 90 TABLET | Refills: 0 | Status: SHIPPED | OUTPATIENT
Start: 2023-02-24

## 2023-03-27 ENCOUNTER — APPOINTMENT (OUTPATIENT)
Dept: LAB | Facility: CLINIC | Age: 68
End: 2023-03-27

## 2023-03-27 DIAGNOSIS — Z00.8 HEALTH EXAMINATION IN POPULATION SURVEY: ICD-10-CM

## 2023-03-28 LAB
CHOLEST SERPL-MCNC: 210 MG/DL
EST. AVERAGE GLUCOSE BLD GHB EST-MCNC: 103 MG/DL
HBA1C MFR BLD: 5.2 %
HDLC SERPL-MCNC: 62 MG/DL
LDLC SERPL CALC-MCNC: 119 MG/DL (ref 0–100)
NONHDLC SERPL-MCNC: 148 MG/DL
TRIGL SERPL-MCNC: 143 MG/DL

## 2023-05-15 DIAGNOSIS — E03.9 ACQUIRED HYPOTHYROIDISM: ICD-10-CM

## 2023-05-15 RX ORDER — LEVOTHYROXINE SODIUM 0.05 MG/1
50 TABLET ORAL DAILY
Qty: 90 TABLET | Refills: 0 | Status: SHIPPED | OUTPATIENT
Start: 2023-05-15

## 2023-05-17 DIAGNOSIS — E03.9 ACQUIRED HYPOTHYROIDISM: Primary | ICD-10-CM

## 2023-06-01 ENCOUNTER — APPOINTMENT (OUTPATIENT)
Age: 68
End: 2023-06-01
Payer: COMMERCIAL

## 2023-06-01 DIAGNOSIS — E03.9 ACQUIRED HYPOTHYROIDISM: ICD-10-CM

## 2023-06-01 LAB
ALBUMIN SERPL BCP-MCNC: 4.3 G/DL (ref 3.5–5)
ALP SERPL-CCNC: 50 U/L (ref 46–116)
ALT SERPL W P-5'-P-CCNC: 20 U/L (ref 12–78)
ANION GAP SERPL CALCULATED.3IONS-SCNC: -1 MMOL/L (ref 4–13)
AST SERPL W P-5'-P-CCNC: 17 U/L (ref 5–45)
BASOPHILS # BLD AUTO: 0.03 THOUSANDS/ÂΜL (ref 0–0.1)
BASOPHILS NFR BLD AUTO: 1 % (ref 0–1)
BILIRUB SERPL-MCNC: 0.5 MG/DL (ref 0.2–1)
BUN SERPL-MCNC: 9 MG/DL (ref 5–25)
CALCIUM SERPL-MCNC: 9 MG/DL (ref 8.3–10.1)
CHLORIDE SERPL-SCNC: 108 MMOL/L (ref 96–108)
CO2 SERPL-SCNC: 28 MMOL/L (ref 21–32)
CREAT SERPL-MCNC: 0.78 MG/DL (ref 0.6–1.3)
EOSINOPHIL # BLD AUTO: 0.11 THOUSAND/ÂΜL (ref 0–0.61)
EOSINOPHIL NFR BLD AUTO: 3 % (ref 0–6)
ERYTHROCYTE [DISTWIDTH] IN BLOOD BY AUTOMATED COUNT: 12.1 % (ref 11.6–15.1)
GFR SERPL CREATININE-BSD FRML MDRD: 80 ML/MIN/1.73SQ M
GLUCOSE P FAST SERPL-MCNC: 88 MG/DL (ref 65–99)
HCT VFR BLD AUTO: 37 % (ref 34.8–46.1)
HGB BLD-MCNC: 13 G/DL (ref 11.5–15.4)
IMM GRANULOCYTES # BLD AUTO: 0.01 THOUSAND/UL (ref 0–0.2)
IMM GRANULOCYTES NFR BLD AUTO: 0 % (ref 0–2)
LYMPHOCYTES # BLD AUTO: 1 THOUSANDS/ÂΜL (ref 0.6–4.47)
LYMPHOCYTES NFR BLD AUTO: 28 % (ref 14–44)
MCH RBC QN AUTO: 32 PG (ref 26.8–34.3)
MCHC RBC AUTO-ENTMCNC: 35.1 G/DL (ref 31.4–37.4)
MCV RBC AUTO: 91 FL (ref 82–98)
MONOCYTES # BLD AUTO: 0.3 THOUSAND/ÂΜL (ref 0.17–1.22)
MONOCYTES NFR BLD AUTO: 8 % (ref 4–12)
NEUTROPHILS # BLD AUTO: 2.15 THOUSANDS/ÂΜL (ref 1.85–7.62)
NEUTS SEG NFR BLD AUTO: 60 % (ref 43–75)
NRBC BLD AUTO-RTO: 0 /100 WBCS
PLATELET # BLD AUTO: 263 THOUSANDS/UL (ref 149–390)
PMV BLD AUTO: 9.7 FL (ref 8.9–12.7)
POTASSIUM SERPL-SCNC: 4 MMOL/L (ref 3.5–5.3)
PROT SERPL-MCNC: 7.2 G/DL (ref 6.4–8.4)
RBC # BLD AUTO: 4.06 MILLION/UL (ref 3.81–5.12)
SODIUM SERPL-SCNC: 135 MMOL/L (ref 135–147)
TSH SERPL DL<=0.05 MIU/L-ACNC: 1.72 UIU/ML (ref 0.45–4.5)
WBC # BLD AUTO: 3.6 THOUSAND/UL (ref 4.31–10.16)

## 2023-06-01 PROCEDURE — 36415 COLL VENOUS BLD VENIPUNCTURE: CPT

## 2023-06-01 PROCEDURE — 84443 ASSAY THYROID STIM HORMONE: CPT

## 2023-06-01 PROCEDURE — 80053 COMPREHEN METABOLIC PANEL: CPT

## 2023-06-01 PROCEDURE — 85025 COMPLETE CBC W/AUTO DIFF WBC: CPT

## 2023-06-03 DIAGNOSIS — R68.2 DRY MOUTH: Primary | ICD-10-CM

## 2023-06-05 ENCOUNTER — APPOINTMENT (OUTPATIENT)
Age: 68
End: 2023-06-05
Payer: COMMERCIAL

## 2023-06-05 DIAGNOSIS — R68.2 DRY MOUTH: ICD-10-CM

## 2023-06-05 LAB — ANA SER QL IA: NEGATIVE

## 2023-06-05 PROCEDURE — 36415 COLL VENOUS BLD VENIPUNCTURE: CPT

## 2023-06-05 PROCEDURE — 86038 ANTINUCLEAR ANTIBODIES: CPT

## 2023-06-12 ENCOUNTER — OFFICE VISIT (OUTPATIENT)
Dept: FAMILY MEDICINE CLINIC | Facility: CLINIC | Age: 68
End: 2023-06-12
Payer: COMMERCIAL

## 2023-06-12 VITALS
HEIGHT: 64 IN | BODY MASS INDEX: 22.91 KG/M2 | DIASTOLIC BLOOD PRESSURE: 70 MMHG | WEIGHT: 134.2 LBS | OXYGEN SATURATION: 99 % | TEMPERATURE: 98.1 F | SYSTOLIC BLOOD PRESSURE: 110 MMHG | HEART RATE: 101 BPM

## 2023-06-12 DIAGNOSIS — Z12.31 ENCOUNTER FOR SCREENING MAMMOGRAM FOR MALIGNANT NEOPLASM OF BREAST: ICD-10-CM

## 2023-06-12 DIAGNOSIS — Z12.11 COLON CANCER SCREENING: ICD-10-CM

## 2023-06-12 DIAGNOSIS — Z00.00 ANNUAL PHYSICAL EXAM: Primary | ICD-10-CM

## 2023-06-12 PROCEDURE — 99397 PER PM REEVAL EST PAT 65+ YR: CPT | Performed by: FAMILY MEDICINE

## 2023-06-12 NOTE — PROGRESS NOTES
[address] [place] [address]    NAME: Priti Fraga  AGE: 74 y o  SEX: female  : 1955     DATE: 2023     Assessment and Plan:     Problem List Items Addressed This Visit    None  Visit Diagnoses     Annual physical exam    -  Primary    Colon cancer screening        Relevant Orders    Cologuard    Encounter for screening mammogram for malignant neoplasm of breast        Relevant Orders    Mammo screening bilateral w 3d & cad          Immunizations and preventive care screenings were discussed with patient today  Appropriate education was printed on patient's after visit summary  Counseling:  · Exercise: the importance of regular exercise/physical activity was discussed  Recommend exercise 3-5 times per week for at least 30 minutes  No follow-ups on file  Chief Complaint:     Chief Complaint   Patient presents with   • Annual Exam      History of Present Illness:     Adult Annual Physical   Patient here for a comprehensive physical exam  The patient reports no problems  Diet and Physical Activity  · Diet/Nutrition: well balanced diet and consuming 3-5 servings of fruits/vegetables daily  · Exercise: walking, vigorous cardiovascular exercise, 5-7 times a week on average and 30-60 minutes on average  Depression Screening  PHQ-2/9 Depression Screening    Little interest or pleasure in doing things: 0 - not at all  Feeling down, depressed, or hopeless: 0 - not at all  PHQ-2 Score: 0  PHQ-2 Interpretation: Negative depression screen       General Health  · Sleep: sleeps well and gets 7-8 hours of sleep on average  · Hearing: normal - bilateral   · Vision: goes for regular eye exams and wears contacts  · Dental: regular dental visits  /GYN Health  · Patient is: postmenopausal  · Last menstrual period: 57 yo  · Contraceptive method: post jamie       Review of Systems:     Review of Systems   Constitutional: Negative for fatigue and fever  HENT: Negative for congestion  Eyes: Negative for visual disturbance  Respiratory: Negative for chest tightness and shortness of breath  Cardiovascular: Negative for chest pain and palpitations  Gastrointestinal: Negative for abdominal pain  Genitourinary: Negative for difficulty urinating  Musculoskeletal: Negative for arthralgias  Neurological: Negative for headaches  Hematological: Does not bruise/bleed easily  Past Medical History:     Past Medical History:   Diagnosis Date   • Allergic Not sure    Hives   • Hypothyroidism 06/15/2021   • Miscarriage Spring 1989      Past Surgical History:     Past Surgical History:   Procedure Laterality Date   • APPENDECTOMY  May 1986   •  SECTION  1985, 06/15/1987, 1990   • SHOULDER SURGERY  ,       Social History:     Social History     Socioeconomic History   • Marital status: Single     Spouse name: None   • Number of children: None   • Years of education: None   • Highest education level: None   Occupational History   • None   Tobacco Use   • Smoking status: Never   • Smokeless tobacco: Never   Substance and Sexual Activity   • Alcohol use:  Yes     Alcohol/week: 2 0 standard drinks of alcohol     Types: 2 Glasses of wine per week   • Drug use: Never   • Sexual activity: Yes     Partners: Male     Birth control/protection: Post-menopausal, Female Sterilization     Comment: Monogamy   Other Topics Concern   • None   Social History Narrative   • None     Social Determinants of Health     Financial Resource Strain: Not on file   Food Insecurity: Not on file   Transportation Needs: Not on file   Physical Activity: Not on file   Stress: Not on file   Social Connections: Not on file   Intimate Partner Violence: Not on file   Housing Stability: Not on file      Family History:     Family History   Problem Relation Age of Onset   • Cancer Sister         Leukemia   • Leukemia Sister 54   • Heart attack "Mother 80   • Abdominal aortic aneurysm Father    • Arthritis Sister    • No Known Problems Maternal Grandmother    • No Known Problems Paternal Grandmother    • No Known Problems Maternal Aunt    • No Known Problems Maternal Aunt    • No Known Problems Maternal Aunt    • Breast cancer Neg Hx       Current Medications:     Current Outpatient Medications   Medication Sig Dispense Refill   • levothyroxine 50 mcg tablet Take 1 tablet (50 mcg total) by mouth daily 90 tablet 0     No current facility-administered medications for this visit  Allergies: Allergies   Allergen Reactions   • Penicillins Hives and Itching      Physical Exam:     /70 (BP Location: Right arm, Patient Position: Sitting, Cuff Size: Standard)   Pulse 101   Temp 98 1 °F (36 7 °C) (Temporal)   Ht 5' 4\" (1 626 m)   Wt 60 9 kg (134 lb 3 2 oz)   SpO2 99%   BMI 23 04 kg/m²     Physical Exam  Vitals reviewed  Constitutional:       Appearance: Normal appearance  She is well-developed and normal weight  HENT:      Right Ear: Tympanic membrane, ear canal and external ear normal       Left Ear: Ear canal and external ear normal  There is impacted cerumen  Nose: Nose normal       Mouth/Throat:      Mouth: Mucous membranes are moist       Pharynx: Oropharynx is clear  Eyes:      Conjunctiva/sclera: Conjunctivae normal       Pupils: Pupils are equal, round, and reactive to light  Cardiovascular:      Rate and Rhythm: Normal rate and regular rhythm  Heart sounds: Normal heart sounds  Pulmonary:      Effort: Pulmonary effort is normal       Breath sounds: Normal breath sounds  Abdominal:      General: Bowel sounds are normal       Palpations: Abdomen is soft  There is no mass  Tenderness: There is no abdominal tenderness  Musculoskeletal:         General: Normal range of motion  Cervical back: Normal range of motion and neck supple  Right lower leg: No edema  Left lower leg: No edema   " Lymphadenopathy:      Cervical: No cervical adenopathy  Skin:     General: Skin is warm  Capillary Refill: Capillary refill takes less than 2 seconds  Neurological:      General: No focal deficit present  Mental Status: She is alert and oriented to person, place, and time  Psychiatric:         Mood and Affect: Mood normal          Behavior: Behavior normal          Thought Content:  Thought content normal          Judgment: Judgment normal           Zachary Cardoza MD  [facility]

## 2023-06-22 ENCOUNTER — PATIENT MESSAGE (OUTPATIENT)
Dept: OBGYN CLINIC | Facility: CLINIC | Age: 68
End: 2023-06-22

## 2023-08-21 DIAGNOSIS — E03.9 ACQUIRED HYPOTHYROIDISM: ICD-10-CM

## 2023-08-21 RX ORDER — LEVOTHYROXINE SODIUM 0.05 MG/1
50 TABLET ORAL DAILY
Qty: 90 TABLET | Refills: 0 | Status: SHIPPED | OUTPATIENT
Start: 2023-08-21

## 2023-09-11 ENCOUNTER — ANNUAL EXAM (OUTPATIENT)
Age: 68
End: 2023-09-11
Payer: COMMERCIAL

## 2023-09-11 VITALS
BODY MASS INDEX: 21.99 KG/M2 | WEIGHT: 128.8 LBS | DIASTOLIC BLOOD PRESSURE: 70 MMHG | HEIGHT: 64 IN | SYSTOLIC BLOOD PRESSURE: 100 MMHG

## 2023-09-11 DIAGNOSIS — Z01.419 ENCOUNTER FOR GYNECOLOGICAL EXAMINATION (GENERAL) (ROUTINE) WITHOUT ABNORMAL FINDINGS: Primary | ICD-10-CM

## 2023-09-11 PROCEDURE — G0145 SCR C/V CYTO,THINLAYER,RESCR: HCPCS

## 2023-09-11 PROCEDURE — S0612 ANNUAL GYNECOLOGICAL EXAMINA: HCPCS

## 2023-09-11 PROCEDURE — G0476 HPV COMBO ASSAY CA SCREEN: HCPCS

## 2023-09-11 NOTE — PATIENT INSTRUCTIONS
Pap Smear   AMBULATORY CARE:   A Pap smear,  or Pap test, is used to screen for cervical cancer. It is also used to find precancerous and cancerous cells of the vulva and vagina. Prepare for a Pap smear: The best time to schedule the test is right after your period stops. Do not have a Pap smear during your monthly period. During a Pap smear:   You will lie on your back and place your feet on footrests called stirrups. Your healthcare provider will gently insert a device called a speculum into your vagina. The speculum is used to open the walls of your vagina so your provider can see your cervix. Your provider will gently take cell samples from your cervix and vagina. The samples are placed in a container with liquid or on a glass slide. They are sent to a lab and examined for abnormal cells. A test for human papillomavirus (HPV) may be done at the same time. HPV is a sexually transmitted virus that can cause changes in cervical cells. After a Pap smear:  You may have some spotting the day of your procedure. Test results: Your healthcare provider will tell you when you can expect your Pap smear results. It usually takes about 1 to 3 weeks. Normal results  mean that no cell changes were found on your cervix. You may be able to wait 3 to 5 years for your next Pap smear exam.    Unclear results  may mean they did not get a good sample of cervical tissue. Or, it may mean there are changes in your cells that look abnormal. This could be due to an infection, pregnancy, menopause, or HPV. You may need another Pap smear in 1 year. Your healthcare provider will tell you what to do next. Abnormal results  mean that cell changes were found on your cervix. This does not mean you have cervical cancer. It could mean you have inflammation or an infection. It could also mean you have HPV or cells that might develop into cancer.  Your healthcare provider will explain the abnormal test result to you and go over next steps with you. He or she may recommend a colposcopy. During this procedure, a small scope with a light is used to look more closely at your cervix and vagina. How often to get a Pap smear:  Pap smears usually start at age 24 and continue until age 72. A Pap smear alone may be done every 3 years. An HPV test alone or with a Pap smear may be done every 5 years, starting at age 27. You may need Pap smears more often or after age 72 if you have any of the following:  Abnormal Pap smear result    Positive HPV test result    A history of cervical cancer, or a high risk for cervical cancer    HIV    A weak immune system    Exposure to diethylstilbestrol (ADAM) medicine when your mother was pregnant with you    Call your doctor if:   You have severe bleeding. It has been 3 weeks and you do not have test results. You have questions or concerns about your condition or care. © Copyright [employer] 2022 Information is for End User's use only and may not be sold, redistributed or otherwise used for commercial purposes. The above information is an  only. It is not intended as medical advice for individual conditions or treatments. Talk to your doctor, nurse or pharmacist before following any medical regimen to see if it is safe and effective for you. HPV (Human Papillomavirus)   AMBULATORY CARE:   Human Papillomavirus (HPV)  is the name for a group of viruses that can infect your skin or other parts of your body. HPV is the most common infection spread by sexual contact. It can also be spread from a mother to her baby during delivery. Common symptoms include the following:   Painless warts in your mouth or on your genitals    Genital or anal discharge, bleeding, itching, or pain    Pain when you urinate    Call your doctor if:   You have new or worsening symptoms. You have questions or concerns about your condition or care.     HPV diagnosis:  Your healthcare provider may use a vinegar liquid to help diagnose HPV genital warts. Women 27to 72years old can be checked for HPV during regular cervical cancer screenings. An HPV test checks for certain types of HPV that can cause changes in cervical cells. Without treatment, the changed cells can become cancer. An HPV test can be done every 5 years if the results show no infection. The test can be done with or without a Pap smear. A Pap smear checks for cancer or for abnormal cells that can become cancer. You may be tested for HPV if you have mouth or throat cancer. Treatment:  HPV cannot be cured, but an infection may go away on its own in about 2 years without causing problems. If the infection continues, some types of HPV can lead to health conditions that need to be treated. Examples include warts and certain cancers, especially squamous cell carcinoma (SCC). HPV-linked SCCs commonly develop in the anus, throat (called oropharyngeal cancer), cervix, vagina, penis, or mouth. HPV can also cause a type of cervical cancer called adenocarcinoma. Symptoms of any of these conditions may not develop for several years after you were exposed to HPV. You will need to be monitored closely. Ask your healthcare provider for more information about monitoring, conditions caused by HPV, and available treatments. Prevent an HPV infection:  HPV is usually spread through sexual activity. The following can help prevent infection:  Ask about the HPV vaccine. The HPV vaccine is given to females and males, usually at 6or 15years of age. It can be given from 9 years through 39years of age, if needed. It is most effective if given before sexual activity begins. Use a new condom, contraceptive barrier, or dental dam each time you have sex. This includes oral, vaginal, and anal sex. Talk to your healthcare provider if you have any questions about what to use or how to use it.     Follow up with your doctor as directed:  Write down your questions so you remember to ask them during your visits. © Copyright [employer] 2022 Information is for End User's use only and may not be sold, redistributed or otherwise used for commercial purposes. The above information is an  only. It is not intended as medical advice for individual conditions or treatments. Talk to your doctor, nurse or pharmacist before following any medical regimen to see if it is safe and effective for you.

## 2023-09-11 NOTE — ASSESSMENT & PLAN NOTE
Pap collected today  SBE encouraged. CBE and mammograms annually. Cologuard ordered by PCP  Declined DXA scan. Daily exercise and healthy diet with adequate calcium and vitamin D encouraged.

## 2023-09-11 NOTE — PROGRESS NOTES
Mary Phillips  1955    Assessment/Plan: Yearly exam     Encounter for gynecological examination (general) (routine) without abnormal findings  Pap collected today  SBE encouraged. CBE and mammograms annually. Cologuard ordered by PCP  Declined DXA scan. Daily exercise and healthy diet with adequate calcium and vitamin D encouraged. Kan Tomlinson was seen today for gynecologic exam.    Diagnoses and all orders for this visit:    Encounter for gynecological examination (general) (routine) without abnormal findings  -     Liquid-based pap, screening      F/U annually or Biannual if Medicare     Health Maintenance:    Last PAP: 2022 Neg/HPV+  Next PAP Due: collected today    Last Mammogram: 2022. BI-RADS 1  Next Mammogram: 2023    Cologuard at home, not yet completed. DXA Scan: Not on file. declined    Subjective    CC: Yearly Exam     Mary Phillips is a 74 y.o. postmenopausal female here for annual exam.     [id number]  Sexual activity: She is sexually active without pain, bleeding or dryness. STD testing:  She does not want STD testing today. non smoker, socially drinker  Exercise- regularly     No concerns today. Denies PMB. She denies breast concerns, vaginal issues, pelvic pain, dyspareunia, urinary symptoms, symptoms of pelvic organ prolapse, urinary, or fecal incontinence today.      Family hx of breast cancer: No  Family hx of ovarian cancer: No  Family hx of colon cancer: No     Past Medical History:   Diagnosis Date   • Allergic Not sure    Hives   • Hypothyroidism 06/15/2021   • Miscarriage Spring 1989     Past Surgical History:   Procedure Laterality Date   • APPENDECTOMY  May 1986   •  SECTION  1985, 06/15/1987, 1990   • [address], 2006      Family History   Problem Relation Age of Onset   • Heart attack Mother 80   • Abdominal aortic aneurysm Father    • Cancer Sister         Leukemia   • Leukemia Sister 54   • Arthritis Sister    • No Known Problems Maternal Grandmother    • No Known Problems Paternal Grandmother    • No Known Problems Maternal Aunt    • No Known Problems Maternal Aunt    • No Known Problems Maternal Aunt    • Breast cancer Neg Hx    • Ovarian cancer Neg Hx    • Cervical cancer Neg Hx    • Uterine cancer Neg Hx      Social History     Tobacco Use   • Smoking status: Never   • Smokeless tobacco: Never   Vaping Use   • Vaping Use: Never used   Substance Use Topics   • Alcohol use: Yes     Alcohol/week: 2.0 standard drinks of alcohol     Types: 2 Glasses of wine per week   • Drug use: Never       Current Outpatient Medications:   •  levothyroxine 50 mcg tablet, Take 1 tablet (50 mcg total) by mouth daily, Disp: 90 tablet, Rfl: 0  Patient Active Problem List    Diagnosis Date Noted   • Encounter for gynecological examination (general) (routine) without abnormal findings 2023   • Acquired hypothyroidism 2021   • Disorder of rotator cuff 2016   • Neck pain 2016   • Shoulder pain 2016       Allergies   Allergen Reactions   • Celecoxib Hives   • Penicillins Hives and Itching       OB History    Para Term  AB Living   3 3 3     3   SAB IAB Ectopic Multiple Live Births           3      # Outcome Date GA Lbr Fito/2nd Weight Sex Delivery Anes PTL Lv   3 Term      CS-Unspec   MESSI   2 Term      CS-Unspec   MESSI   1 Term      CS-Unspec   MESSI       Vitals:    23 0720   BP: 100/70   BP Location: Left arm   Patient Position: Sitting   Weight: 58.4 kg (128 lb 12.8 oz)   Height: 5' 4" (1.626 m)     Body mass index is 22.11 kg/m². Review of Systems   Constitutional: Negative for chills, fatigue, fever and unexpected weight change. Gastrointestinal: Negative for abdominal pain, constipation, diarrhea, nausea and vomiting. Genitourinary: Negative for difficulty urinating, dyspareunia, dysuria, frequency, pelvic pain, urgency, vaginal bleeding, vaginal discharge and vaginal pain.    Musculoskeletal: Negative for back pain and myalgias. Skin: Negative for pallor and rash. Neurological: Negative for headaches. Hematological: Negative for adenopathy. Psychiatric/Behavioral: Negative for dysphoric mood. Physical Exam  Constitutional:       General: She is not in acute distress. Appearance: Normal appearance. She is not ill-appearing. Genitourinary:      Urethral meatus normal.      No lesions in the vagina. Right Labia: No rash, tenderness or lesions. Left Labia: No tenderness, lesions or rash. No inguinal adenopathy present in the right or left side. No vaginal discharge, erythema, tenderness or ulceration. No vaginal prolapse present. Mild vaginal atrophy present. Right Adnexa: not tender, not full and no mass present. Left Adnexa: not tender, not full and no mass present. Cervix is nulliparous. No cervical motion tenderness, discharge, friability, lesion, polyp or nabothian cyst.      Uterus is not enlarged, fixed, tender or prolapsed. No uterine mass detected. No urethral prolapse, tenderness or discharge present. Pelvic exam was performed with patient in the lithotomy position. Breasts:     Right: No swelling, inverted nipple, mass, nipple discharge, skin change or tenderness. Left: No swelling, inverted nipple, mass, nipple discharge, skin change or tenderness. HENT:      Head: Normocephalic and atraumatic. Eyes:      Conjunctiva/sclera: Conjunctivae normal.   Pulmonary:      Effort: Pulmonary effort is normal.   Abdominal:      General: There is no distension. Palpations: Abdomen is soft. Tenderness: There is no abdominal tenderness. Musculoskeletal:         General: Normal range of motion. Cervical back: Neck supple. Lymphadenopathy:      Upper Body:      Right upper body: No supraclavicular or axillary adenopathy. Left upper body: No supraclavicular or axillary adenopathy.       Lower Body: No right inguinal adenopathy. No left inguinal adenopathy. Neurological:      Mental Status: She is alert and oriented to person, place, and time. Skin:     General: Skin is warm and dry. Psychiatric:         Mood and Affect: Mood normal.         Behavior: Behavior normal.         Thought Content: Thought content normal.   Vitals and nursing note reviewed.

## 2023-09-15 LAB
LAB AP GYN PRIMARY INTERPRETATION: NORMAL
Lab: NORMAL

## 2023-11-10 PROBLEM — Z01.419 ENCOUNTER FOR GYNECOLOGICAL EXAMINATION (GENERAL) (ROUTINE) WITHOUT ABNORMAL FINDINGS: Status: RESOLVED | Noted: 2023-09-11 | Resolved: 2023-11-10

## 2023-11-15 ENCOUNTER — HOSPITAL ENCOUNTER (OUTPATIENT)
Age: 68
Discharge: HOME | End: 2023-11-15
Payer: COMMERCIAL

## 2023-11-15 DIAGNOSIS — Z12.31 ENCOUNTER FOR SCREENING MAMMOGRAM FOR MALIGNANT NEOPLASM OF BREAST: ICD-10-CM

## 2023-11-15 PROCEDURE — 77067 SCR MAMMO BI INCL CAD: CPT

## 2023-11-15 PROCEDURE — 77063 BREAST TOMOSYNTHESIS BI: CPT

## 2023-11-17 DIAGNOSIS — E03.9 ACQUIRED HYPOTHYROIDISM: ICD-10-CM

## 2023-11-17 RX ORDER — LEVOTHYROXINE SODIUM 0.05 MG/1
50 TABLET ORAL DAILY
Qty: 90 TABLET | Refills: 1 | Status: SHIPPED | OUTPATIENT
Start: 2023-11-17

## 2023-12-12 ENCOUNTER — COSMETIC (OUTPATIENT)
Age: 68
End: 2023-12-12

## 2023-12-12 DIAGNOSIS — Z41.1 ENCOUNTER FOR COSMETIC PROCEDURE: Primary | ICD-10-CM

## 2023-12-12 NOTE — PROGRESS NOTES
Patient is a 61-year-old female who is here today for possible nonsurgical fusion revision, patient is interested in Botox. She is interested in Botox clinical to the periumbilical area, she has never had Botox before, she does not take steroids or blood thinners, she is taking levothyroxine but otherwise does not have chronic medical problems. PE-- Very strong periorbital rhytids with animation, strong- moderate at rest     discussed with the patient that she is an excellent candidate for Botox, we discussed the risks, benefits, and alternatives of Botox including the nature of the treatment, its mechanism of action, the risk of bleeding, bruising, infection, scarring, asymmetry, poor aesthetic result, under correction, over-correction, brow ptosis, eyelid ptosis, need for repeat treatment, need for revision, need for touchup, discussed with her the Botox typically takes approximately 3-4 days to see for affected lost approximately 3-4 months. All the patient's questions were answered to her satisfaction, informed consent obtained and signed. She underwent 20 units injection into the periorbital area, 10 units per side directly into the muscle. The patient tolerated the procedure well, she can follow-up in 3-4 months or sooner p.r.n. for treatment. 20 units, 1 areas.

## 2024-02-17 DIAGNOSIS — E03.9 ACQUIRED HYPOTHYROIDISM: ICD-10-CM

## 2024-02-18 RX ORDER — LEVOTHYROXINE SODIUM 0.05 MG/1
50 TABLET ORAL DAILY
Qty: 90 TABLET | Refills: 1 | Status: SHIPPED | OUTPATIENT
Start: 2024-02-18

## 2024-04-17 ENCOUNTER — APPOINTMENT (OUTPATIENT)
Age: 69
End: 2024-04-17
Payer: COMMERCIAL

## 2024-04-17 DIAGNOSIS — Z00.8 HEALTH EXAMINATION IN POPULATION SURVEY: ICD-10-CM

## 2024-04-17 LAB
CHOLEST SERPL-MCNC: 199 MG/DL
HDLC SERPL-MCNC: 84 MG/DL
LDLC SERPL CALC-MCNC: 105 MG/DL (ref 0–100)
NONHDLC SERPL-MCNC: 115 MG/DL
TRIGL SERPL-MCNC: 51 MG/DL

## 2024-04-17 PROCEDURE — 36415 COLL VENOUS BLD VENIPUNCTURE: CPT

## 2024-04-17 PROCEDURE — 83036 HEMOGLOBIN GLYCOSYLATED A1C: CPT

## 2024-04-17 PROCEDURE — 80061 LIPID PANEL: CPT

## 2024-04-18 LAB
EST. AVERAGE GLUCOSE BLD GHB EST-MCNC: 111 MG/DL
HBA1C MFR BLD: 5.5 %

## 2024-05-29 DIAGNOSIS — E03.9 ACQUIRED HYPOTHYROIDISM: Primary | ICD-10-CM

## 2024-06-06 ENCOUNTER — APPOINTMENT (OUTPATIENT)
Dept: LAB | Facility: CLINIC | Age: 69
End: 2024-06-06
Payer: COMMERCIAL

## 2024-06-06 DIAGNOSIS — E03.9 ACQUIRED HYPOTHYROIDISM: ICD-10-CM

## 2024-06-06 LAB
BASOPHILS # BLD AUTO: 0.04 THOUSANDS/ÂΜL (ref 0–0.1)
BASOPHILS NFR BLD AUTO: 1 % (ref 0–1)
EOSINOPHIL # BLD AUTO: 0.09 THOUSAND/ÂΜL (ref 0–0.61)
EOSINOPHIL NFR BLD AUTO: 1 % (ref 0–6)
ERYTHROCYTE [DISTWIDTH] IN BLOOD BY AUTOMATED COUNT: 12.1 % (ref 11.6–15.1)
HCT VFR BLD AUTO: 38.3 % (ref 34.8–46.1)
HGB BLD-MCNC: 13 G/DL (ref 11.5–15.4)
IMM GRANULOCYTES # BLD AUTO: 0.01 THOUSAND/UL (ref 0–0.2)
IMM GRANULOCYTES NFR BLD AUTO: 0 % (ref 0–2)
LYMPHOCYTES # BLD AUTO: 1.5 THOUSANDS/ÂΜL (ref 0.6–4.47)
LYMPHOCYTES NFR BLD AUTO: 21 % (ref 14–44)
MCH RBC QN AUTO: 31.2 PG (ref 26.8–34.3)
MCHC RBC AUTO-ENTMCNC: 33.9 G/DL (ref 31.4–37.4)
MCV RBC AUTO: 92 FL (ref 82–98)
MONOCYTES # BLD AUTO: 0.42 THOUSAND/ÂΜL (ref 0.17–1.22)
MONOCYTES NFR BLD AUTO: 6 % (ref 4–12)
NEUTROPHILS # BLD AUTO: 5.07 THOUSANDS/ÂΜL (ref 1.85–7.62)
NEUTS SEG NFR BLD AUTO: 71 % (ref 43–75)
NRBC BLD AUTO-RTO: 0 /100 WBCS
PLATELET # BLD AUTO: 255 THOUSANDS/UL (ref 149–390)
PMV BLD AUTO: 10.2 FL (ref 8.9–12.7)
RBC # BLD AUTO: 4.17 MILLION/UL (ref 3.81–5.12)
TSH SERPL DL<=0.05 MIU/L-ACNC: 1.12 UIU/ML (ref 0.45–4.5)
WBC # BLD AUTO: 7.13 THOUSAND/UL (ref 4.31–10.16)

## 2024-06-06 PROCEDURE — 84443 ASSAY THYROID STIM HORMONE: CPT

## 2024-06-06 PROCEDURE — 36415 COLL VENOUS BLD VENIPUNCTURE: CPT

## 2024-06-06 PROCEDURE — 80053 COMPREHEN METABOLIC PANEL: CPT

## 2024-06-06 PROCEDURE — 85025 COMPLETE CBC W/AUTO DIFF WBC: CPT

## 2024-06-07 LAB
ALBUMIN SERPL BCP-MCNC: 4.7 G/DL (ref 3.5–5)
ALP SERPL-CCNC: 56 U/L (ref 34–104)
ALT SERPL W P-5'-P-CCNC: 11 U/L (ref 7–52)
ANION GAP SERPL CALCULATED.3IONS-SCNC: 10 MMOL/L (ref 4–13)
AST SERPL W P-5'-P-CCNC: 19 U/L (ref 13–39)
BILIRUB SERPL-MCNC: 0.83 MG/DL (ref 0.2–1)
BUN SERPL-MCNC: 19 MG/DL (ref 5–25)
CALCIUM SERPL-MCNC: 9.7 MG/DL (ref 8.4–10.2)
CHLORIDE SERPL-SCNC: 102 MMOL/L (ref 96–108)
CO2 SERPL-SCNC: 28 MMOL/L (ref 21–32)
CREAT SERPL-MCNC: 0.65 MG/DL (ref 0.6–1.3)
GFR SERPL CREATININE-BSD FRML MDRD: 92 ML/MIN/1.73SQ M
GLUCOSE P FAST SERPL-MCNC: 87 MG/DL (ref 65–99)
POTASSIUM SERPL-SCNC: 3.8 MMOL/L (ref 3.5–5.3)
PROT SERPL-MCNC: 6.9 G/DL (ref 6.4–8.4)
SODIUM SERPL-SCNC: 140 MMOL/L (ref 135–147)

## 2024-06-13 ENCOUNTER — OFFICE VISIT (OUTPATIENT)
Dept: FAMILY MEDICINE CLINIC | Facility: CLINIC | Age: 69
End: 2024-06-13
Payer: COMMERCIAL

## 2024-06-13 VITALS
DIASTOLIC BLOOD PRESSURE: 68 MMHG | WEIGHT: 126.4 LBS | HEART RATE: 76 BPM | SYSTOLIC BLOOD PRESSURE: 106 MMHG | BODY MASS INDEX: 22.39 KG/M2 | OXYGEN SATURATION: 99 % | TEMPERATURE: 97.2 F | HEIGHT: 63 IN

## 2024-06-13 DIAGNOSIS — Z00.00 ANNUAL PHYSICAL EXAM: Primary | ICD-10-CM

## 2024-06-13 PROCEDURE — 99397 PER PM REEVAL EST PAT 65+ YR: CPT | Performed by: FAMILY MEDICINE

## 2024-06-13 NOTE — PROGRESS NOTES
Adult Annual Physical  Name: Kelly Silva      : 1955      MRN: [id number]  Encounter Provider: Karen Alex MD  Encounter Date: 2024   Encounter department: [facility]    Assessment & Plan   1. Annual physical exam    Immunizations and preventive care screenings were discussed with patient today. Appropriate education was printed on patient's after visit summary.    Counseling:  Injury prevention: discussed safety/seat belts, safety helmets, smoke detectors, carbon dioxide detectors, and smoking near bedding or upholstery.      Depression Screening and Follow-up Plan: Patient was screened for depression during today's encounter. They screened negative with a PHQ-2 score of 0.        History of Present Illness     Adult Annual Physical:  Patient presents for annual physical.     Diet and Physical Activity:  - Diet/Nutrition: well balanced diet and consuming 3-5 servings of fruits/vegetables daily.  - Exercise: strength training exercises and moderate cardiovascular exercise.    Depression Screening:  - PHQ-2 Score: 0    General Health:  - Sleep: sleeps well.  - Hearing: normal hearing bilateral ears.  - Vision: wears contacts, goes for regular eye exams and wears glasses.  - Dental: regular dental visits.    /GYN Health:  - Follows with GYN: yes.   - Menopause: postmenopausal.   - History of STDs: no  - Contraception: menopause.      Advanced Care Planning:  - Has an advanced directive?: no    - Has a durable medical POA?: no    - ACP document given to patient?: no      Review of Systems   Constitutional:  Negative for fatigue and fever.   HENT:  Negative for congestion.    Eyes:  Negative for visual disturbance.   Respiratory:  Negative for chest tightness and shortness of breath.    Cardiovascular:  Negative for chest pain and palpitations.   Gastrointestinal:  Negative for abdominal pain.   Genitourinary:  Negative for difficulty urinating.   Musculoskeletal:  Negative for  "arthralgias.   Neurological:  Negative for headaches.   Hematological:  Does not bruise/bleed easily.     Current Outpatient Medications on File Prior to Visit   Medication Sig Dispense Refill    levothyroxine 50 mcg tablet Take 1 tablet (50 mcg total) by mouth daily 90 tablet 1     No current facility-administered medications on file prior to visit.      Social History     Tobacco Use    Smoking status: Never    Smokeless tobacco: Never   Vaping Use    Vaping status: Never Used   Substance and Sexual Activity    Alcohol use: Yes     Alcohol/week: 2.0 standard drinks of alcohol     Types: 2 Glasses of wine per week    Drug use: Never    Sexual activity: Yes     Partners: Male     Birth control/protection: Post-menopausal, Female Sterilization     Comment: Monogamy       Objective     /68 (BP Location: Right arm, Patient Position: Sitting, Cuff Size: Standard)   Pulse 76   Temp (!) 97.2 °F (36.2 °C) (Temporal)   Ht 5' 3\" (1.6 m)   Wt 57.3 kg (126 lb 6.4 oz)   SpO2 99%   BMI 22.39 kg/m²     Physical Exam  Vitals reviewed.   Constitutional:       Appearance: Normal appearance. She is well-developed.   HENT:      Right Ear: Ear canal and external ear normal. There is impacted cerumen.      Left Ear: Ear canal and external ear normal. There is impacted cerumen.      Nose: Nose normal.      Mouth/Throat:      Mouth: Mucous membranes are moist.   Eyes:      Conjunctiva/sclera: Conjunctivae normal.      Pupils: Pupils are equal, round, and reactive to light.   Cardiovascular:      Rate and Rhythm: Normal rate and regular rhythm.      Heart sounds: Normal heart sounds.   Pulmonary:      Effort: Pulmonary effort is normal.      Breath sounds: Normal breath sounds.   Abdominal:      General: Bowel sounds are normal.      Palpations: Abdomen is soft.   Musculoskeletal:         General: Normal range of motion.      Cervical back: Normal range of motion and neck supple.      Right lower leg: No edema.      Left lower " leg: No edema.   Lymphadenopathy:      Cervical: No cervical adenopathy.   Skin:     General: Skin is warm.      Capillary Refill: Capillary refill takes less than 2 seconds.   Neurological:      General: No focal deficit present.      Mental Status: She is alert and oriented to person, place, and time.   Psychiatric:         Mood and Affect: Mood normal.         Behavior: Behavior normal.         Thought Content: Thought content normal.         Judgment: Judgment normal.       Administrative Statements

## 2024-09-16 NOTE — PATIENT INSTRUCTIONS
Patient Education     Lowering Your Risk of Breast Cancer   About this topic   Breast cancer is a serious illness. Breast cancer is when abnormal cells grow and divide more quickly in your breast. These cells form a growth or tumor. The abnormal cells may enter nearby tissue and spread to other parts of the body. It is the type of cancer most often seen in women. Men can have breast cancer, but it is a rare condition.  General   Some things in your life may increase your risk of breast cancer. You may not be able to change some of these. Others you can control.  You are more likely to get breast cancer if you:  Have a mother, sister, or daughter who has had breast cancer  Have used hormones for menopause for more than 5 years  Have had radiation therapy to the breast or chest in the past  Are overweight or do not exercise  Had your first menstrual period before you were 11 years old  Went through menopause after age 55  Have never been pregnant or had your first child after age 35  Have had breast cancer before  Drink alcohol in any form  Have dense breasts  Are older in age  There is no certain way to prevent breast cancer. There are things you can do to lower your chances of having breast cancer.  Keep a healthy weight. Lose weight if you are overweight. Being overweight raises your chances of having breast cancer.  Eat a healthy diet to maintain a healthy weight, such as more fruits, vegetables, and lean cuts of meat. Decrease the amount of saturated fat in your diet.  Exercise. Being active helps you keep a healthy weight.  Limit your alcohol intake or do not drink alcohol. The more alcohol you drink, the higher your risk.  Do not smoke cigarettes. Smoking can increase your risk of many types of cancer.  Breastfeed your baby. This may help protect you. The longer you breastfeed, the more protection you have.  Talk with your doctor about:  Limiting or stopping hormone therapy.  Taking certain drugs to prevent  breast cancer. For women at high risk of having breast cancer, there are a few drugs that may lower your risk.  Surgery to prevent you from having breast cancer if you are very high risk.  When do I need to call the doctor?   Changes in your breasts  A lump or area in your breast that feels different  Discharge from your nipple  Skin on your breast is dimpled or indented  You have questions or concerns about your breasts  Helpful tips   Talk to your doctor about the best kind of breast cancer screening for you.  If you want to do self breast exams, have your doctor show you the right way to do them.  Tell your doctor of any abnormal finding.  Last Reviewed Date   2021-10-04  Consumer Information Use and Disclaimer   This generalized information is a limited summary of diagnosis, treatment, and/or medication information. It is not meant to be comprehensive and should be used as a tool to help the user understand and/or assess potential diagnostic and treatment options. It does NOT include all information about conditions, treatments, medications, side effects, or risks that may apply to a specific patient. It is not intended to be medical advice or a substitute for the medical advice, diagnosis, or treatment of a health care provider based on the health care provider's examination and assessment of a patient’s specific and unique circumstances. Patients must speak with a health care provider for complete information about their health, medical questions, and treatment options, including any risks or benefits regarding use of medications. This information does not endorse any treatments or medications as safe, effective, or approved for treating a specific patient. UpToDate, Inc. and its affiliates disclaim any warranty or liability relating to this information or the use thereof. The use of this information is governed by the Terms of Use, available at  https://www.[employer]olters[employer]uwer.com/en/know/clinical-effectiveness-terms   Copyright   Copyright © 2024 UpToDate, Inc. and its affiliates and/or licensors. All rights reserved.

## 2024-09-17 ENCOUNTER — ANNUAL EXAM (OUTPATIENT)
Age: 69
End: 2024-09-17
Payer: COMMERCIAL

## 2024-09-17 VITALS
WEIGHT: 123 LBS | BODY MASS INDEX: 21.79 KG/M2 | DIASTOLIC BLOOD PRESSURE: 80 MMHG | SYSTOLIC BLOOD PRESSURE: 102 MMHG | HEIGHT: 63 IN

## 2024-09-17 DIAGNOSIS — Z12.31 SCREENING MAMMOGRAM FOR BREAST CANCER: ICD-10-CM

## 2024-09-17 DIAGNOSIS — Z78.0 ASYMPTOMATIC POSTMENOPAUSAL STATUS: ICD-10-CM

## 2024-09-17 DIAGNOSIS — R87.810 CERVICAL HIGH RISK HPV (HUMAN PAPILLOMAVIRUS) TEST POSITIVE: ICD-10-CM

## 2024-09-17 DIAGNOSIS — Z01.419 ENCOUNTER FOR GYNECOLOGICAL EXAMINATION (GENERAL) (ROUTINE) WITHOUT ABNORMAL FINDINGS: Primary | ICD-10-CM

## 2024-09-17 PROCEDURE — G0145 SCR C/V CYTO,THINLAYER,RESCR: HCPCS | Performed by: NURSE PRACTITIONER

## 2024-09-17 PROCEDURE — G0476 HPV COMBO ASSAY CA SCREEN: HCPCS | Performed by: NURSE PRACTITIONER

## 2024-09-17 PROCEDURE — S0612 ANNUAL GYNECOLOGICAL EXAMINA: HCPCS | Performed by: NURSE PRACTITIONER

## 2024-09-17 NOTE — PROGRESS NOTES
Diagnoses and all orders for this visit:    Encounter for gynecological examination (general) (routine) without abnormal findings    Asymptomatic postmenopausal status    Cervical high risk HPV (human papillomavirus) test positive  -     Liquid-based pap, screening    Screening mammogram for breast cancer  Comments:  scheduled    Call as needed, encouraged calcium/vit D in her diet, call with any PMB, all questions answered          Pleasant 68 y.o. postmenopausal female here for annual exam. She denies postmenopausal bleeding. She denies history of abnormal pap smears, last Paps in , and  have been positive for non 16/18 HPV. A pap with cotest was done today. She denies vaginal issues. She denies pelvic pain. She denies postmenopausal issues. She is sexually active without any issue. Last mammogram 11/15/2023 normal, not dense. Lifetime Tyrer-Cuzick: 3.29 %.  DXA declined. Colonoscopy and Cologuard declined.    Past Medical History:   Diagnosis Date    Allergic Not sure    Hives    Disease of thyroid gland 2021    hypothyroidism    Hypothyroidism 06/15/2021    Miscarriage Spring 1989    Visual impairment unknown    contacts/glasses     Past Surgical History:   Procedure Laterality Date    APPENDECTOMY  1986     SECTION  1985, 06/15/1987, 1990    SHOULDER SURGERY  , 2006    TUBAL LIGATION  90     Family History   Problem Relation Age of Onset    Heart attack Mother 81    Abdominal aortic aneurysm Father     Leukemia Sister 55    Cancer Sister         Leukemia    Arthritis Sister     No Known Problems Maternal Grandmother     No Known Problems Paternal Grandmother     No Known Problems Maternal Aunt     No Known Problems Maternal Aunt     No Known Problems Maternal Aunt     Breast cancer Neg Hx     Ovarian cancer Neg Hx     Cervical cancer Neg Hx     Uterine cancer Neg Hx      Social History     Tobacco Use    Smoking status: Never    Smokeless tobacco: Never   Vaping  "Use    Vaping status: Never Used   Substance Use Topics    Alcohol use: Yes     Alcohol/week: 2.0 standard drinks of alcohol     Types: 2 Glasses of wine per week    Drug use: Never       Current Outpatient Medications:     levothyroxine 50 mcg tablet, Take 1 tablet (50 mcg total) by mouth daily, Disp: 90 tablet, Rfl: 1  Patient Active Problem List    Diagnosis Date Noted    Acquired hypothyroidism 2021    Disorder of rotator cuff 2016       Allergies   Allergen Reactions    Celecoxib Hives    Penicillins Hives and Itching       OB History    Para Term  AB Living   3 3 3     3   SAB IAB Ectopic Multiple Live Births           3      # Outcome Date GA Lbr Fito/2nd Weight Sex Type Anes PTL Lv   3 Term      CS-Unspec   MESSI   2 Term      CS-Unspec   MESSI   1 Term      CS-Unspec   MESSI     3 sons  1 step granddaughter  Walks 5 miles per day, works with weights      Vitals:    24 0656   BP: 102/80   Weight: 55.8 kg (123 lb)   Height: 5' 3\" (1.6 m)     Body mass index is 21.79 kg/m².    Review of Systems   Constitutional: Negative for chills, fatigue, fever and unexpected weight change.   Respiratory: Negative for shortness of breath.    Gastrointestinal: Negative for anal bleeding, blood in stool, constipation and diarrhea.   Genitourinary: Negative for difficulty urinating, dysuria and hematuria.     Physical Exam   Constitutional: She appears well-developed and well-nourished. No distress.   HENT: atraumatic, EOMI  Head: Normocephalic.   Neck: Normal range of motion. Neck supple.   Pulmonary: Effort normal.  Breasts: bilateral without masses, skin changes or nipple discharge. Bilaterally soft and warm to touch. No areas of erythema or pain.  Abdominal: Soft.   Pelvic exam was performed with patient supine. No labial fusion. There is no rash, tenderness, lesion or injury on the right labia. There is no rash, tenderness, lesion or injury on the left labia. Urethral meatus does not show any " tenderness, inflammation or discharge. Palpation of midline bladder without pain or discomfort. Uterus is not deviated, not enlarged, not fixed and not tender. Cervix exhibits no motion tenderness, no discharge and no friability. STENOTIC OS. Right adnexum displays no mass, no tenderness and no fullness. Left adnexum displays no mass, no tenderness and no fullness. No erythema or tenderness in the vagina. No foreign body in the vagina. No signs of injury around the vagina or anus. Perineum without lesions, signs of injury, erythema or swelling. No vaginal discharge found.   Lymphadenopathy:        Right: No inguinal adenopathy present.        Left: No inguinal adenopathy present.

## 2024-09-18 LAB
HPV HR 12 DNA CVX QL NAA+PROBE: NEGATIVE
HPV16 DNA CVX QL NAA+PROBE: NEGATIVE
HPV18 DNA CVX QL NAA+PROBE: NEGATIVE

## 2024-09-20 LAB
LAB AP GYN PRIMARY INTERPRETATION: NORMAL
Lab: NORMAL

## 2024-10-04 DIAGNOSIS — E03.9 ACQUIRED HYPOTHYROIDISM: ICD-10-CM

## 2024-10-04 RX ORDER — LEVOTHYROXINE SODIUM 50 UG/1
50 TABLET ORAL DAILY
Qty: 90 TABLET | Refills: 1 | Status: SHIPPED | OUTPATIENT
Start: 2024-10-04

## 2024-11-18 ENCOUNTER — HOSPITAL ENCOUNTER (OUTPATIENT)
Age: 69
Discharge: HOME | End: 2024-11-18
Payer: COMMERCIAL

## 2024-11-18 DIAGNOSIS — Z12.31 ENCOUNTER FOR SCREENING MAMMOGRAM FOR BREAST CANCER: Primary | ICD-10-CM

## 2024-11-18 DIAGNOSIS — Z12.31 SCREENING MAMMOGRAM, ENCOUNTER FOR: ICD-10-CM

## 2024-11-18 PROCEDURE — 77063 BREAST TOMOSYNTHESIS BI: CPT

## 2024-11-18 PROCEDURE — 77067 SCR MAMMO BI INCL CAD: CPT

## 2024-11-19 ENCOUNTER — RESULTS FOLLOW-UP (OUTPATIENT)
Dept: FAMILY MEDICINE CLINIC | Facility: CLINIC | Age: 69
End: 2024-11-19

## 2025-04-07 DIAGNOSIS — E03.9 ACQUIRED HYPOTHYROIDISM: ICD-10-CM

## 2025-04-08 RX ORDER — LEVOTHYROXINE SODIUM 50 UG/1
50 TABLET ORAL DAILY
Qty: 90 TABLET | Refills: 0 | Status: SHIPPED | OUTPATIENT
Start: 2025-04-08

## 2025-06-04 DIAGNOSIS — Z00.00 ANNUAL PHYSICAL EXAM: ICD-10-CM

## 2025-06-04 DIAGNOSIS — E03.9 ACQUIRED HYPOTHYROIDISM: Primary | ICD-10-CM

## 2025-06-11 ENCOUNTER — APPOINTMENT (OUTPATIENT)
Age: 70
End: 2025-06-11
Attending: FAMILY MEDICINE
Payer: COMMERCIAL

## 2025-06-11 DIAGNOSIS — E03.9 ACQUIRED HYPOTHYROIDISM: ICD-10-CM

## 2025-06-11 DIAGNOSIS — Z00.00 ANNUAL PHYSICAL EXAM: ICD-10-CM

## 2025-06-11 LAB
ALBUMIN SERPL BCG-MCNC: 4.5 G/DL (ref 3.5–5)
ALP SERPL-CCNC: 52 U/L (ref 34–104)
ALT SERPL W P-5'-P-CCNC: 14 U/L (ref 7–52)
ANION GAP SERPL CALCULATED.3IONS-SCNC: 7 MMOL/L (ref 4–13)
AST SERPL W P-5'-P-CCNC: 21 U/L (ref 13–39)
BASOPHILS # BLD AUTO: 0.04 THOUSANDS/ÂΜL (ref 0–0.1)
BASOPHILS NFR BLD AUTO: 1 % (ref 0–1)
BILIRUB SERPL-MCNC: 0.44 MG/DL (ref 0.2–1)
BUN SERPL-MCNC: 15 MG/DL (ref 5–25)
CALCIUM SERPL-MCNC: 9.1 MG/DL (ref 8.4–10.2)
CHLORIDE SERPL-SCNC: 107 MMOL/L (ref 96–108)
CHOLEST SERPL-MCNC: 194 MG/DL (ref ?–200)
CO2 SERPL-SCNC: 29 MMOL/L (ref 21–32)
CREAT SERPL-MCNC: 0.66 MG/DL (ref 0.6–1.3)
EOSINOPHIL # BLD AUTO: 0.1 THOUSAND/ÂΜL (ref 0–0.61)
EOSINOPHIL NFR BLD AUTO: 2 % (ref 0–6)
ERYTHROCYTE [DISTWIDTH] IN BLOOD BY AUTOMATED COUNT: 11.9 % (ref 11.6–15.1)
EST. AVERAGE GLUCOSE BLD GHB EST-MCNC: 108 MG/DL
GFR SERPL CREATININE-BSD FRML MDRD: 91 ML/MIN/1.73SQ M
GLUCOSE P FAST SERPL-MCNC: 88 MG/DL (ref 65–99)
HBA1C MFR BLD: 5.4 %
HCT VFR BLD AUTO: 34.9 % (ref 34.8–46.1)
HDLC SERPL-MCNC: 68 MG/DL
HGB BLD-MCNC: 11.3 G/DL (ref 11.5–15.4)
IMM GRANULOCYTES # BLD AUTO: 0.01 THOUSAND/UL (ref 0–0.2)
IMM GRANULOCYTES NFR BLD AUTO: 0 % (ref 0–2)
LDLC SERPL CALC-MCNC: 113 MG/DL (ref 0–100)
LYMPHOCYTES # BLD AUTO: 1 THOUSANDS/ÂΜL (ref 0.6–4.47)
LYMPHOCYTES NFR BLD AUTO: 23 % (ref 14–44)
MCH RBC QN AUTO: 29.9 PG (ref 26.8–34.3)
MCHC RBC AUTO-ENTMCNC: 32.4 G/DL (ref 31.4–37.4)
MCV RBC AUTO: 92 FL (ref 82–98)
MONOCYTES # BLD AUTO: 0.38 THOUSAND/ÂΜL (ref 0.17–1.22)
MONOCYTES NFR BLD AUTO: 9 % (ref 4–12)
NEUTROPHILS # BLD AUTO: 2.91 THOUSANDS/ÂΜL (ref 1.85–7.62)
NEUTS SEG NFR BLD AUTO: 65 % (ref 43–75)
NONHDLC SERPL-MCNC: 126 MG/DL
NRBC BLD AUTO-RTO: 0 /100 WBCS
PLATELET # BLD AUTO: 240 THOUSANDS/UL (ref 149–390)
PMV BLD AUTO: 9.7 FL (ref 8.9–12.7)
POTASSIUM SERPL-SCNC: 4.2 MMOL/L (ref 3.5–5.3)
PROT SERPL-MCNC: 6.8 G/DL (ref 6.4–8.4)
RBC # BLD AUTO: 3.78 MILLION/UL (ref 3.81–5.12)
SODIUM SERPL-SCNC: 143 MMOL/L (ref 135–147)
TRIGL SERPL-MCNC: 67 MG/DL (ref ?–150)
TSH SERPL DL<=0.05 MIU/L-ACNC: 1.55 UIU/ML (ref 0.45–4.5)
WBC # BLD AUTO: 4.44 THOUSAND/UL (ref 4.31–10.16)

## 2025-06-11 PROCEDURE — 36415 COLL VENOUS BLD VENIPUNCTURE: CPT

## 2025-06-11 PROCEDURE — 84443 ASSAY THYROID STIM HORMONE: CPT

## 2025-06-11 PROCEDURE — 83036 HEMOGLOBIN GLYCOSYLATED A1C: CPT

## 2025-06-11 PROCEDURE — 80053 COMPREHEN METABOLIC PANEL: CPT

## 2025-06-11 PROCEDURE — 80061 LIPID PANEL: CPT

## 2025-06-11 PROCEDURE — 85025 COMPLETE CBC W/AUTO DIFF WBC: CPT

## 2025-06-12 ENCOUNTER — RESULTS FOLLOW-UP (OUTPATIENT)
Dept: FAMILY MEDICINE CLINIC | Facility: CLINIC | Age: 70
End: 2025-06-12

## 2025-06-18 ENCOUNTER — OFFICE VISIT (OUTPATIENT)
Dept: FAMILY MEDICINE CLINIC | Facility: CLINIC | Age: 70
End: 2025-06-18
Payer: COMMERCIAL

## 2025-06-18 VITALS
BODY MASS INDEX: 21.23 KG/M2 | DIASTOLIC BLOOD PRESSURE: 70 MMHG | HEART RATE: 79 BPM | OXYGEN SATURATION: 99 % | WEIGHT: 119.8 LBS | TEMPERATURE: 98 F | HEIGHT: 63 IN | SYSTOLIC BLOOD PRESSURE: 108 MMHG

## 2025-06-18 DIAGNOSIS — Z82.49 FAMILY HISTORY OF ABDOMINAL AORTIC ANEURYSM (AAA): ICD-10-CM

## 2025-06-18 DIAGNOSIS — Z00.00 ANNUAL PHYSICAL EXAM: Primary | ICD-10-CM

## 2025-06-18 DIAGNOSIS — E03.9 ACQUIRED HYPOTHYROIDISM: ICD-10-CM

## 2025-06-18 PROCEDURE — 99397 PER PM REEVAL EST PAT 65+ YR: CPT | Performed by: FAMILY MEDICINE

## 2025-06-18 RX ORDER — LEVOTHYROXINE SODIUM 50 UG/1
50 TABLET ORAL DAILY
Qty: 90 TABLET | Refills: 3 | Status: SHIPPED | OUTPATIENT
Start: 2025-06-18

## 2025-06-18 NOTE — PATIENT INSTRUCTIONS
"Patient Education     Routine physical for adults   The Basics   Written by the doctors and editors at [facility]   What is a physical? -- A physical is a routine visit, or \"check-up,\" with your doctor. You might also hear it called a \"wellness visit\" or \"preventive visit.\"  During each visit, the doctor will:   Ask about your physical and mental health   Ask about your habits, behaviors, and lifestyle   Do an exam   Give you vaccines if needed   Talk to you about any medicines you take   Give advice about your health   Answer your questions  Getting regular check-ups is an important part of taking care of your health. It can help your doctor find and treat any problems you have. But it's also important for preventing health problems.  A routine physical is different from a \"sick visit.\" A sick visit is when you see a doctor because of a health concern or problem. Since physicals are scheduled ahead of time, you can think about what you want to ask the doctor.  How often should I get a physical? -- It depends on your age and health. In general, for people age 21 years and older:   If you are younger than 50 years, you might be able to get a physical every 3 years.   If you are 50 years or older, your doctor might recommend a physical every year.  If you have an ongoing health condition, like diabetes or high blood pressure, your doctor will probably want to see you more often.  What happens during a physical? -- In general, each visit will include:   Physical exam - The doctor or nurse will check your height, weight, heart rate, and blood pressure. They will also look at your eyes and ears. They will ask about how you are feeling and whether you have any symptoms that bother you.   Medicines - It's a good idea to bring a list of all the medicines you take to each doctor visit. Your doctor will talk to you about your medicines and answer any questions. Tell them if you are having any side effects that bother you. You " "should also tell them if you are having trouble paying for any of your medicines.   Habits and behaviors - This includes:   Your diet   Your exercise habits   Whether you smoke, drink alcohol, or use drugs   Whether you are sexually active   Whether you feel safe at home  Your doctor will talk to you about things you can do to improve your health and lower your risk of health problems. They will also offer help and support. For example, if you want to quit smoking, they can give you advice and might prescribe medicines. If you want to improve your diet or get more physical activity, they can help you with this, too.   Lab tests, if needed - The tests you get will depend on your age and situation. For example, your doctor might want to check your:   Cholesterol   Blood sugar   Iron level   Vaccines - The recommended vaccines will depend on your age, health, and what vaccines you already had. Vaccines are very important because they can prevent certain serious or deadly infections.   Discussion of screening - \"Screening\" means checking for diseases or other health problems before they cause symptoms. Your doctor can recommend screening based on your age, risk, and preferences. This might include tests to check for:   Cancer, such as breast, prostate, cervical, ovarian, colorectal, prostate, lung, or skin cancer   Sexually transmitted infections, such as chlamydia and gonorrhea   Mental health conditions like depression and anxiety  Your doctor will talk to you about the different types of screening tests. They can help you decide which screenings to have. They can also explain what the results might mean.   Answering questions - The physical is a good time to ask the doctor or nurse questions about your health. If needed, they can refer you to other doctors or specialists, too.  Adults older than 65 years often need other care, too. As you get older, your doctor will talk to you about:   How to prevent falling at " home   Hearing or vision tests   Memory testing   How to take your medicines safely   Making sure that you have the help and support you need at home  All topics are updated as new evidence becomes available and our peer review process is complete.  This topic retrieved from [employer] on: May 02, 2024.  Topic 455080 Version 1.0  Release: 32.4.3 - C32.122  © 2024 UpToDate, Inc. and/or its affiliates. All rights reserved.  Consumer Information Use and Disclaimer   Disclaimer: This generalized information is a limited summary of diagnosis, treatment, and/or medication information. It is not meant to be comprehensive and should be used as a tool to help the user understand and/or assess potential diagnostic and treatment options. It does NOT include all information about conditions, treatments, medications, side effects, or risks that may apply to a specific patient. It is not intended to be medical advice or a substitute for the medical advice, diagnosis, or treatment of a health care provider based on the health care provider's examination and assessment of a patient's specific and unique circumstances. Patients must speak with a health care provider for complete information about their health, medical questions, and treatment options, including any risks or benefits regarding use of medications. This information does not endorse any treatments or medications as safe, effective, or approved for treating a specific patient. UpToDate, Inc. and its affiliates disclaim any warranty or liability relating to this information or the use thereof.The use of this information is governed by the Terms of Use, available at https://www.wolters[employer]agouwer.com/en/know/clinical-effectiveness-terms. 2024© UpToDate, Inc. and its affiliates and/or licensors. All rights reserved.  Copyright   © 2024 UpToDate, Inc. and/or its affiliates. All rights reserved.

## 2025-06-18 NOTE — PROGRESS NOTES
Adult Annual Physical  Name: Kelly Silva      : 1955      MRN: [id number]  Encounter Provider: Karen Alex MD  Encounter Date: 2025   Encounter department: [facility]    :  Assessment & Plan  Family history of abdominal aortic aneurysm (AAA)    Orders:    US abdominal aorta; Future    Acquired hypothyroidism    Orders:    levothyroxine 50 mcg tablet; Take 1 tablet (50 mcg total) by mouth daily    Annual physical exam             Preventive Screenings:  - Diabetes Screening: screening up-to-date  - Hepatitis C screening: screening not indicated   - HIV screening: screening not indicated   - Cervical cancer screening: screening not indicated   - Breast cancer screening: screening up-to-date   - Colon cancer screening: patient declines   - Lung cancer screening: screening not indicated   - Osteoporosis screening: pateint declines     Immunizations:  - Immunizations due: Prevnar 20 and Tdap      Depression Screening and Follow-up Plan: Patient was screened for depression during today's encounter. They screened negative with a PHQ-2 score of 0.          History of Present Illness     Adult Annual Physical:  Patient presents for annual physical. Here for cpx. Labs normal, Hgb dropped slightly bc pt donated blood 2 weeks prior, donates every 53 days. TSH stable on current dose. Dad with AAA, pt wants screening test. She is not on medicare. .     Diet and Physical Activity:  - Diet/Nutrition: well balanced diet.  - Exercise: walking, moderate cardiovascular exercise, strength training exercises, 1-2 times a week on average and 5-7 times a week on average.    Depression Screening:  - PHQ-2 Score: 0    General Health:  - Sleep: sleeps well.  - Hearing: normal hearing right ear and normal hearing left ear.  - Vision: most recent eye exam < 1 year ago.  - Dental: regular dental visits.    /GYN Health:  - Follows with GYN: yes.   - Menopause: postmenopausal.   - History of STDs:  "no    Advanced Care Planning:  - Has an advanced directive?: no    - Has a durable medical POA?: no      Review of Systems   Constitutional:  Negative for chills and fever.   HENT:  Negative for ear pain and sore throat.    Eyes:  Negative for pain and visual disturbance.   Respiratory:  Negative for cough and shortness of breath.    Cardiovascular:  Negative for chest pain and palpitations.   Gastrointestinal:  Negative for abdominal pain and vomiting.   Genitourinary:  Negative for dysuria and hematuria.   Musculoskeletal:  Negative for arthralgias and back pain.   Skin:  Negative for color change and rash.   Neurological:  Negative for seizures and syncope.   All other systems reviewed and are negative.        Objective   /70 (BP Location: Right arm, Patient Position: Sitting, Cuff Size: Standard)   Pulse 79   Temp 98 °F (36.7 °C) (Temporal)   Ht 5' 3.25\" (1.607 m)   Wt 54.3 kg (119 lb 12.8 oz)   SpO2 99%   BMI 21.05 kg/m²     Physical Exam  Vitals and nursing note reviewed.   Constitutional:       General: She is not in acute distress.     Appearance: Normal appearance. She is well-developed.   HENT:      Head: Normocephalic and atraumatic.      Right Ear: Tympanic membrane, ear canal and external ear normal.      Left Ear: Tympanic membrane, ear canal and external ear normal.      Nose: Nose normal.      Mouth/Throat:      Mouth: Mucous membranes are moist.      Pharynx: Oropharynx is clear.     Eyes:      Conjunctiva/sclera: Conjunctivae normal.      Pupils: Pupils are equal, round, and reactive to light.       Cardiovascular:      Rate and Rhythm: Normal rate and regular rhythm.      Heart sounds: Normal heart sounds. No murmur heard.  Pulmonary:      Effort: Pulmonary effort is normal. No respiratory distress.      Breath sounds: Normal breath sounds.   Abdominal:      General: Bowel sounds are normal.      Palpations: Abdomen is soft.      Tenderness: There is no abdominal tenderness. "     Musculoskeletal:         General: No swelling. Normal range of motion.      Cervical back: Normal range of motion and neck supple.      Right lower leg: No edema.      Left lower leg: No edema.   Lymphadenopathy:      Cervical: No cervical adenopathy.     Skin:     General: Skin is warm and dry.      Capillary Refill: Capillary refill takes less than 2 seconds.     Neurological:      General: No focal deficit present.      Mental Status: She is alert and oriented to person, place, and time.      Deep Tendon Reflexes: Reflexes normal.     Psychiatric:         Mood and Affect: Mood normal.         Behavior: Behavior normal.         Thought Content: Thought content normal.         Judgment: Judgment normal.